# Patient Record
Sex: MALE | Race: WHITE | Employment: OTHER | URBAN - METROPOLITAN AREA
[De-identification: names, ages, dates, MRNs, and addresses within clinical notes are randomized per-mention and may not be internally consistent; named-entity substitution may affect disease eponyms.]

---

## 2017-01-03 ENCOUNTER — NURSE ONLY (OUTPATIENT)
Dept: ENT CLINIC | Age: 62
End: 2017-01-03

## 2017-01-03 DIAGNOSIS — J30.2 SEASONAL ALLERGIC RHINITIS, UNSPECIFIED ALLERGIC RHINITIS TRIGGER: Primary | ICD-10-CM

## 2017-01-03 PROCEDURE — 95117 IMMUNOTHERAPY INJECTIONS: CPT | Performed by: OTOLARYNGOLOGY

## 2017-01-10 ENCOUNTER — NURSE ONLY (OUTPATIENT)
Dept: ENT CLINIC | Age: 62
End: 2017-01-10

## 2017-01-10 DIAGNOSIS — J30.2 SEASONAL ALLERGIC RHINITIS, UNSPECIFIED ALLERGIC RHINITIS TRIGGER: Primary | ICD-10-CM

## 2017-01-10 PROCEDURE — 95117 IMMUNOTHERAPY INJECTIONS: CPT | Performed by: OTOLARYNGOLOGY

## 2017-01-19 ENCOUNTER — NURSE ONLY (OUTPATIENT)
Dept: ENT CLINIC | Age: 62
End: 2017-01-19

## 2017-01-19 DIAGNOSIS — J30.2 SEASONAL ALLERGIC RHINITIS, UNSPECIFIED ALLERGIC RHINITIS TRIGGER: Primary | ICD-10-CM

## 2017-01-19 PROCEDURE — 95117 IMMUNOTHERAPY INJECTIONS: CPT | Performed by: OTOLARYNGOLOGY

## 2017-01-26 ENCOUNTER — NURSE ONLY (OUTPATIENT)
Dept: ENT CLINIC | Age: 62
End: 2017-01-26

## 2017-01-26 DIAGNOSIS — J30.2 SEASONAL ALLERGIC RHINITIS, UNSPECIFIED ALLERGIC RHINITIS TRIGGER: Primary | ICD-10-CM

## 2017-01-26 PROCEDURE — 95117 IMMUNOTHERAPY INJECTIONS: CPT | Performed by: OTOLARYNGOLOGY

## 2017-02-02 ENCOUNTER — TELEPHONE (OUTPATIENT)
Dept: INTERNAL MEDICINE CLINIC | Age: 62
End: 2017-02-02

## 2017-02-02 DIAGNOSIS — R91.8 PULMONARY NODULES: Primary | ICD-10-CM

## 2017-02-03 ENCOUNTER — NURSE ONLY (OUTPATIENT)
Dept: ENT CLINIC | Age: 62
End: 2017-02-03

## 2017-02-03 DIAGNOSIS — J30.2 SEASONAL ALLERGIC RHINITIS, UNSPECIFIED ALLERGIC RHINITIS TRIGGER: Primary | ICD-10-CM

## 2017-02-03 PROCEDURE — 95117 IMMUNOTHERAPY INJECTIONS: CPT | Performed by: OTOLARYNGOLOGY

## 2017-02-07 ENCOUNTER — TELEPHONE (OUTPATIENT)
Dept: ENT CLINIC | Age: 62
End: 2017-02-07

## 2017-02-07 DIAGNOSIS — J20.8 ACUTE BRONCHITIS DUE TO OTHER SPECIFIED ORGANISMS: Primary | ICD-10-CM

## 2017-02-07 RX ORDER — AZITHROMYCIN 250 MG/1
TABLET, FILM COATED ORAL
Qty: 1 PACKET | Refills: 0 | Status: SHIPPED | OUTPATIENT
Start: 2017-02-07 | End: 2017-04-17

## 2017-02-07 RX ORDER — PROMETHAZINE HYDROCHLORIDE AND CODEINE PHOSPHATE 6.25; 1 MG/5ML; MG/5ML
5 SYRUP ORAL EVERY 4 HOURS PRN
Qty: 240 ML | Refills: 0 | OUTPATIENT
Start: 2017-02-07 | End: 2017-02-14

## 2017-02-10 ENCOUNTER — NURSE ONLY (OUTPATIENT)
Dept: ENT CLINIC | Age: 62
End: 2017-02-10

## 2017-02-10 DIAGNOSIS — J30.2 SEASONAL ALLERGIC RHINITIS, UNSPECIFIED ALLERGIC RHINITIS TRIGGER: Primary | ICD-10-CM

## 2017-02-10 PROCEDURE — 95117 IMMUNOTHERAPY INJECTIONS: CPT | Performed by: OTOLARYNGOLOGY

## 2017-02-16 RX ORDER — PANTOPRAZOLE SODIUM 40 MG/1
TABLET, DELAYED RELEASE ORAL
Qty: 30 TABLET | Refills: 1 | Status: SHIPPED | OUTPATIENT
Start: 2017-02-16 | End: 2017-04-17 | Stop reason: SDUPTHER

## 2017-02-17 ENCOUNTER — TELEPHONE (OUTPATIENT)
Dept: INTERNAL MEDICINE CLINIC | Age: 62
End: 2017-02-17

## 2017-02-17 ENCOUNTER — NURSE ONLY (OUTPATIENT)
Dept: ENT CLINIC | Age: 62
End: 2017-02-17

## 2017-02-17 DIAGNOSIS — J30.2 SEASONAL ALLERGIC RHINITIS, UNSPECIFIED ALLERGIC RHINITIS TRIGGER: Primary | ICD-10-CM

## 2017-02-17 PROCEDURE — 95117 IMMUNOTHERAPY INJECTIONS: CPT | Performed by: OTOLARYNGOLOGY

## 2017-02-27 ENCOUNTER — NURSE ONLY (OUTPATIENT)
Dept: ENT CLINIC | Age: 62
End: 2017-02-27

## 2017-02-27 DIAGNOSIS — J30.2 SEASONAL ALLERGIC RHINITIS, UNSPECIFIED ALLERGIC RHINITIS TRIGGER: Primary | ICD-10-CM

## 2017-02-27 PROCEDURE — 95117 IMMUNOTHERAPY INJECTIONS: CPT | Performed by: OTOLARYNGOLOGY

## 2017-03-06 ENCOUNTER — NURSE ONLY (OUTPATIENT)
Dept: ENT CLINIC | Age: 62
End: 2017-03-06

## 2017-03-06 DIAGNOSIS — J30.2 SEASONAL ALLERGIC RHINITIS, UNSPECIFIED ALLERGIC RHINITIS TRIGGER: Primary | ICD-10-CM

## 2017-03-06 PROCEDURE — 95117 IMMUNOTHERAPY INJECTIONS: CPT | Performed by: OTOLARYNGOLOGY

## 2017-03-14 ENCOUNTER — NURSE ONLY (OUTPATIENT)
Dept: ENT CLINIC | Age: 62
End: 2017-03-14

## 2017-03-14 DIAGNOSIS — J30.2 SEASONAL ALLERGIC RHINITIS, UNSPECIFIED ALLERGIC RHINITIS TRIGGER: Primary | ICD-10-CM

## 2017-03-14 PROCEDURE — 95117 IMMUNOTHERAPY INJECTIONS: CPT | Performed by: OTOLARYNGOLOGY

## 2017-03-21 ENCOUNTER — NURSE ONLY (OUTPATIENT)
Dept: ENT CLINIC | Age: 62
End: 2017-03-21

## 2017-03-21 DIAGNOSIS — J30.2 SEASONAL ALLERGIC RHINITIS, UNSPECIFIED ALLERGIC RHINITIS TRIGGER: Primary | ICD-10-CM

## 2017-03-21 PROCEDURE — 95117 IMMUNOTHERAPY INJECTIONS: CPT | Performed by: OTOLARYNGOLOGY

## 2017-03-28 ENCOUNTER — NURSE ONLY (OUTPATIENT)
Dept: ENT CLINIC | Age: 62
End: 2017-03-28

## 2017-03-28 DIAGNOSIS — J30.2 SEASONAL ALLERGIC RHINITIS, UNSPECIFIED ALLERGIC RHINITIS TRIGGER: Primary | ICD-10-CM

## 2017-03-28 PROCEDURE — 95117 IMMUNOTHERAPY INJECTIONS: CPT | Performed by: OTOLARYNGOLOGY

## 2017-04-04 ENCOUNTER — NURSE ONLY (OUTPATIENT)
Dept: ENT CLINIC | Age: 62
End: 2017-04-04

## 2017-04-04 DIAGNOSIS — J30.2 SEASONAL ALLERGIC RHINITIS, UNSPECIFIED ALLERGIC RHINITIS TRIGGER: Primary | ICD-10-CM

## 2017-04-04 PROCEDURE — 95117 IMMUNOTHERAPY INJECTIONS: CPT | Performed by: OTOLARYNGOLOGY

## 2017-04-11 ENCOUNTER — NURSE ONLY (OUTPATIENT)
Dept: ENT CLINIC | Age: 62
End: 2017-04-11

## 2017-04-11 DIAGNOSIS — J30.2 SEASONAL ALLERGIC RHINITIS, UNSPECIFIED ALLERGIC RHINITIS TRIGGER: Primary | ICD-10-CM

## 2017-04-11 PROCEDURE — 95117 IMMUNOTHERAPY INJECTIONS: CPT | Performed by: OTOLARYNGOLOGY

## 2017-04-17 RX ORDER — PANTOPRAZOLE SODIUM 40 MG/1
TABLET, DELAYED RELEASE ORAL
Qty: 30 TABLET | Refills: 0 | Status: SHIPPED | OUTPATIENT
Start: 2017-04-17 | End: 2017-05-15 | Stop reason: SDUPTHER

## 2017-04-18 ENCOUNTER — NURSE ONLY (OUTPATIENT)
Dept: ENT CLINIC | Age: 62
End: 2017-04-18

## 2017-04-18 DIAGNOSIS — J30.2 SEASONAL ALLERGIC RHINITIS, UNSPECIFIED ALLERGIC RHINITIS TRIGGER: Primary | ICD-10-CM

## 2017-04-18 PROCEDURE — 95117 IMMUNOTHERAPY INJECTIONS: CPT | Performed by: OTOLARYNGOLOGY

## 2017-04-25 ENCOUNTER — NURSE ONLY (OUTPATIENT)
Dept: ENT CLINIC | Age: 62
End: 2017-04-25

## 2017-04-25 DIAGNOSIS — J30.2 SEASONAL ALLERGIC RHINITIS, UNSPECIFIED ALLERGIC RHINITIS TRIGGER: Primary | ICD-10-CM

## 2017-04-25 PROCEDURE — 95117 IMMUNOTHERAPY INJECTIONS: CPT | Performed by: OTOLARYNGOLOGY

## 2017-05-02 ENCOUNTER — NURSE ONLY (OUTPATIENT)
Dept: ENT CLINIC | Age: 62
End: 2017-05-02

## 2017-05-02 DIAGNOSIS — J30.2 SEASONAL ALLERGIC RHINITIS, UNSPECIFIED ALLERGIC RHINITIS TRIGGER: Primary | ICD-10-CM

## 2017-05-02 PROCEDURE — 95117 IMMUNOTHERAPY INJECTIONS: CPT | Performed by: OTOLARYNGOLOGY

## 2017-05-09 ENCOUNTER — NURSE ONLY (OUTPATIENT)
Dept: ENT CLINIC | Age: 62
End: 2017-05-09

## 2017-05-09 DIAGNOSIS — J30.2 SEASONAL ALLERGIC RHINITIS, UNSPECIFIED ALLERGIC RHINITIS TRIGGER: Primary | ICD-10-CM

## 2017-05-09 PROCEDURE — 95117 IMMUNOTHERAPY INJECTIONS: CPT | Performed by: OTOLARYNGOLOGY

## 2017-05-15 ENCOUNTER — NURSE ONLY (OUTPATIENT)
Dept: ENT CLINIC | Age: 62
End: 2017-05-15

## 2017-05-15 DIAGNOSIS — J30.2 SEASONAL ALLERGIC RHINITIS, UNSPECIFIED ALLERGIC RHINITIS TRIGGER: Primary | ICD-10-CM

## 2017-05-15 PROCEDURE — 95117 IMMUNOTHERAPY INJECTIONS: CPT | Performed by: OTOLARYNGOLOGY

## 2017-05-16 RX ORDER — PANTOPRAZOLE SODIUM 40 MG/1
TABLET, DELAYED RELEASE ORAL
Qty: 30 TABLET | Refills: 0 | Status: SHIPPED | OUTPATIENT
Start: 2017-05-16 | End: 2017-06-15 | Stop reason: SDUPTHER

## 2017-05-22 ENCOUNTER — NURSE ONLY (OUTPATIENT)
Dept: ENT CLINIC | Age: 62
End: 2017-05-22

## 2017-05-22 DIAGNOSIS — J30.2 SEASONAL ALLERGIC RHINITIS, UNSPECIFIED ALLERGIC RHINITIS TRIGGER: Primary | ICD-10-CM

## 2017-05-22 PROCEDURE — 95117 IMMUNOTHERAPY INJECTIONS: CPT | Performed by: OTOLARYNGOLOGY

## 2017-05-25 ENCOUNTER — TELEPHONE (OUTPATIENT)
Dept: ENT CLINIC | Age: 62
End: 2017-05-25

## 2017-05-25 DIAGNOSIS — J20.9 ACUTE BRONCHITIS, UNSPECIFIED ORGANISM: Primary | ICD-10-CM

## 2017-05-25 RX ORDER — AZITHROMYCIN 250 MG/1
TABLET, FILM COATED ORAL
Qty: 1 PACKET | Refills: 0 | Status: SHIPPED | OUTPATIENT
Start: 2017-05-25 | End: 2017-07-28 | Stop reason: ALTCHOICE

## 2017-05-25 RX ORDER — BENZONATATE 100 MG/1
100 CAPSULE ORAL EVERY 6 HOURS PRN
Qty: 20 CAPSULE | Refills: 0 | Status: SHIPPED | OUTPATIENT
Start: 2017-05-25 | End: 2017-07-28 | Stop reason: ALTCHOICE

## 2017-05-31 ENCOUNTER — NURSE ONLY (OUTPATIENT)
Dept: ENT CLINIC | Age: 62
End: 2017-05-31

## 2017-05-31 DIAGNOSIS — J30.2 SEASONAL ALLERGIC RHINITIS, UNSPECIFIED ALLERGIC RHINITIS TRIGGER: Primary | ICD-10-CM

## 2017-05-31 PROCEDURE — 95117 IMMUNOTHERAPY INJECTIONS: CPT | Performed by: OTOLARYNGOLOGY

## 2017-06-06 RX ORDER — TAMSULOSIN HYDROCHLORIDE 0.4 MG/1
CAPSULE ORAL
Qty: 30 CAPSULE | Refills: 5 | Status: SHIPPED | OUTPATIENT
Start: 2017-06-06 | End: 2017-12-10 | Stop reason: SDUPTHER

## 2017-06-06 RX ORDER — ATORVASTATIN CALCIUM 20 MG/1
TABLET, FILM COATED ORAL
Qty: 30 TABLET | Refills: 5 | Status: SHIPPED | OUTPATIENT
Start: 2017-06-06 | End: 2017-12-10 | Stop reason: SDUPTHER

## 2017-06-07 ENCOUNTER — NURSE ONLY (OUTPATIENT)
Dept: ENT CLINIC | Age: 62
End: 2017-06-07

## 2017-06-07 DIAGNOSIS — J30.2 SEASONAL ALLERGIC RHINITIS, UNSPECIFIED ALLERGIC RHINITIS TRIGGER: Primary | ICD-10-CM

## 2017-06-07 PROCEDURE — 95117 IMMUNOTHERAPY INJECTIONS: CPT | Performed by: OTOLARYNGOLOGY

## 2017-06-14 ENCOUNTER — NURSE ONLY (OUTPATIENT)
Dept: ENT CLINIC | Age: 62
End: 2017-06-14

## 2017-06-14 DIAGNOSIS — J30.2 SEASONAL ALLERGIC RHINITIS, UNSPECIFIED ALLERGIC RHINITIS TRIGGER: Primary | ICD-10-CM

## 2017-06-14 PROCEDURE — 95117 IMMUNOTHERAPY INJECTIONS: CPT | Performed by: OTOLARYNGOLOGY

## 2017-06-15 RX ORDER — PANTOPRAZOLE SODIUM 40 MG/1
TABLET, DELAYED RELEASE ORAL
Qty: 30 TABLET | Refills: 0 | Status: SHIPPED | OUTPATIENT
Start: 2017-06-15 | End: 2017-07-09 | Stop reason: SDUPTHER

## 2017-06-26 ENCOUNTER — NURSE ONLY (OUTPATIENT)
Dept: ENT CLINIC | Age: 62
End: 2017-06-26

## 2017-06-26 DIAGNOSIS — J30.2 SEASONAL ALLERGIC RHINITIS, UNSPECIFIED ALLERGIC RHINITIS TRIGGER: Primary | ICD-10-CM

## 2017-06-26 PROCEDURE — 95117 IMMUNOTHERAPY INJECTIONS: CPT | Performed by: OTOLARYNGOLOGY

## 2017-07-05 ENCOUNTER — NURSE ONLY (OUTPATIENT)
Dept: ENT CLINIC | Age: 62
End: 2017-07-05

## 2017-07-05 DIAGNOSIS — J30.2 SEASONAL ALLERGIC RHINITIS, UNSPECIFIED ALLERGIC RHINITIS TRIGGER: Primary | ICD-10-CM

## 2017-07-05 PROCEDURE — 95117 IMMUNOTHERAPY INJECTIONS: CPT | Performed by: OTOLARYNGOLOGY

## 2017-07-10 ENCOUNTER — TELEPHONE (OUTPATIENT)
Dept: INTERNAL MEDICINE CLINIC | Age: 62
End: 2017-07-10

## 2017-07-10 RX ORDER — PANTOPRAZOLE SODIUM 40 MG/1
TABLET, DELAYED RELEASE ORAL
Qty: 30 TABLET | Refills: 0 | Status: SHIPPED | OUTPATIENT
Start: 2017-07-10 | End: 2017-08-08 | Stop reason: SDUPTHER

## 2017-07-12 ENCOUNTER — NURSE ONLY (OUTPATIENT)
Dept: ENT CLINIC | Age: 62
End: 2017-07-12

## 2017-07-12 DIAGNOSIS — J30.2 SEASONAL ALLERGIC RHINITIS, UNSPECIFIED ALLERGIC RHINITIS TRIGGER: Primary | ICD-10-CM

## 2017-07-12 PROCEDURE — 95117 IMMUNOTHERAPY INJECTIONS: CPT | Performed by: OTOLARYNGOLOGY

## 2017-07-19 ENCOUNTER — NURSE ONLY (OUTPATIENT)
Dept: ENT CLINIC | Age: 62
End: 2017-07-19

## 2017-07-19 DIAGNOSIS — J30.2 SEASONAL ALLERGIC RHINITIS, UNSPECIFIED ALLERGIC RHINITIS TRIGGER: Primary | ICD-10-CM

## 2017-07-19 PROCEDURE — 95117 IMMUNOTHERAPY INJECTIONS: CPT | Performed by: OTOLARYNGOLOGY

## 2017-07-26 ENCOUNTER — NURSE ONLY (OUTPATIENT)
Dept: ENT CLINIC | Age: 62
End: 2017-07-26

## 2017-07-26 DIAGNOSIS — J30.2 SEASONAL ALLERGIC RHINITIS, UNSPECIFIED ALLERGIC RHINITIS TRIGGER: Primary | ICD-10-CM

## 2017-07-26 PROCEDURE — 95117 IMMUNOTHERAPY INJECTIONS: CPT | Performed by: OTOLARYNGOLOGY

## 2017-07-28 ENCOUNTER — OFFICE VISIT (OUTPATIENT)
Dept: INTERNAL MEDICINE CLINIC | Age: 62
End: 2017-07-28

## 2017-07-28 VITALS
DIASTOLIC BLOOD PRESSURE: 74 MMHG | BODY MASS INDEX: 32.95 KG/M2 | SYSTOLIC BLOOD PRESSURE: 128 MMHG | HEIGHT: 66 IN | HEART RATE: 80 BPM | WEIGHT: 205 LBS

## 2017-07-28 DIAGNOSIS — B07.0 PLANTAR WART: ICD-10-CM

## 2017-07-28 DIAGNOSIS — E78.00 HYPERCHOLESTEREMIA: ICD-10-CM

## 2017-07-28 DIAGNOSIS — N40.1 BENIGN PROSTATIC HYPERPLASIA WITH LOWER URINARY TRACT SYMPTOMS, UNSPECIFIED MORPHOLOGY: ICD-10-CM

## 2017-07-28 DIAGNOSIS — E11.9 TYPE 2 DIABETES MELLITUS WITHOUT COMPLICATION, WITHOUT LONG-TERM CURRENT USE OF INSULIN (HCC): Primary | ICD-10-CM

## 2017-07-28 LAB
ALBUMIN SERPL-MCNC: 4.6 G/DL (ref 3.4–5)
ALP BLD-CCNC: 73 U/L (ref 40–129)
ALT SERPL-CCNC: 44 U/L (ref 10–40)
ANION GAP SERPL CALCULATED.3IONS-SCNC: 16 MMOL/L (ref 3–16)
AST SERPL-CCNC: 22 U/L (ref 15–37)
BILIRUB SERPL-MCNC: 0.3 MG/DL (ref 0–1)
BILIRUBIN DIRECT: <0.2 MG/DL (ref 0–0.3)
BILIRUBIN, INDIRECT: ABNORMAL MG/DL (ref 0–1)
BUN BLDV-MCNC: 21 MG/DL (ref 7–20)
CALCIUM SERPL-MCNC: 9.9 MG/DL (ref 8.3–10.6)
CHLORIDE BLD-SCNC: 103 MMOL/L (ref 99–110)
CHOLESTEROL, TOTAL: 154 MG/DL (ref 0–199)
CO2: 24 MMOL/L (ref 21–32)
CREAT SERPL-MCNC: 0.7 MG/DL (ref 0.8–1.3)
CREATININE URINE: 75.2 MG/DL (ref 39–259)
GFR AFRICAN AMERICAN: >60
GFR NON-AFRICAN AMERICAN: >60
GLUCOSE BLD-MCNC: 94 MG/DL (ref 70–99)
HDLC SERPL-MCNC: 59 MG/DL (ref 40–60)
LDL CHOLESTEROL CALCULATED: 83 MG/DL
MICROALBUMIN UR-MCNC: <1.2 MG/DL
MICROALBUMIN/CREAT UR-RTO: NORMAL MG/G (ref 0–30)
PHOSPHORUS: 3.2 MG/DL (ref 2.5–4.9)
POTASSIUM SERPL-SCNC: 3.8 MMOL/L (ref 3.5–5.1)
SODIUM BLD-SCNC: 143 MMOL/L (ref 136–145)
TOTAL PROTEIN: 7 G/DL (ref 6.4–8.2)
TRIGL SERPL-MCNC: 58 MG/DL (ref 0–150)
VLDLC SERPL CALC-MCNC: 12 MG/DL

## 2017-07-28 PROCEDURE — 99214 OFFICE O/P EST MOD 30 MIN: CPT | Performed by: INTERNAL MEDICINE

## 2017-07-28 ASSESSMENT — PATIENT HEALTH QUESTIONNAIRE - PHQ9
SUM OF ALL RESPONSES TO PHQ QUESTIONS 1-9: 0
SUM OF ALL RESPONSES TO PHQ9 QUESTIONS 1 & 2: 0
2. FEELING DOWN, DEPRESSED OR HOPELESS: 0
1. LITTLE INTEREST OR PLEASURE IN DOING THINGS: 0

## 2017-07-29 LAB
ESTIMATED AVERAGE GLUCOSE: 131.2 MG/DL
HBA1C MFR BLD: 6.2 %

## 2017-08-02 ENCOUNTER — NURSE ONLY (OUTPATIENT)
Dept: ENT CLINIC | Age: 62
End: 2017-08-02

## 2017-08-02 DIAGNOSIS — J30.2 SEASONAL ALLERGIC RHINITIS, UNSPECIFIED ALLERGIC RHINITIS TRIGGER: Primary | ICD-10-CM

## 2017-08-02 PROCEDURE — 95117 IMMUNOTHERAPY INJECTIONS: CPT | Performed by: OTOLARYNGOLOGY

## 2017-08-09 ENCOUNTER — NURSE ONLY (OUTPATIENT)
Dept: ENT CLINIC | Age: 62
End: 2017-08-09

## 2017-08-09 DIAGNOSIS — J30.2 SEASONAL ALLERGIC RHINITIS, UNSPECIFIED ALLERGIC RHINITIS TRIGGER: Primary | ICD-10-CM

## 2017-08-09 PROCEDURE — 95117 IMMUNOTHERAPY INJECTIONS: CPT | Performed by: OTOLARYNGOLOGY

## 2017-08-09 RX ORDER — PANTOPRAZOLE SODIUM 40 MG/1
TABLET, DELAYED RELEASE ORAL
Qty: 30 TABLET | Refills: 0 | Status: SHIPPED | OUTPATIENT
Start: 2017-08-09 | End: 2017-09-06 | Stop reason: SDUPTHER

## 2017-08-16 ENCOUNTER — NURSE ONLY (OUTPATIENT)
Dept: ENT CLINIC | Age: 62
End: 2017-08-16

## 2017-08-16 DIAGNOSIS — J30.2 SEASONAL ALLERGIC RHINITIS, UNSPECIFIED ALLERGIC RHINITIS TRIGGER: Primary | ICD-10-CM

## 2017-08-16 PROCEDURE — 95117 IMMUNOTHERAPY INJECTIONS: CPT | Performed by: OTOLARYNGOLOGY

## 2017-08-23 ENCOUNTER — NURSE ONLY (OUTPATIENT)
Dept: ENT CLINIC | Age: 62
End: 2017-08-23

## 2017-08-23 DIAGNOSIS — J30.2 SEASONAL ALLERGIC RHINITIS, UNSPECIFIED ALLERGIC RHINITIS TRIGGER: Primary | ICD-10-CM

## 2017-08-23 PROCEDURE — 95117 IMMUNOTHERAPY INJECTIONS: CPT | Performed by: OTOLARYNGOLOGY

## 2017-08-30 ENCOUNTER — NURSE ONLY (OUTPATIENT)
Dept: ENT CLINIC | Age: 62
End: 2017-08-30

## 2017-08-30 DIAGNOSIS — J30.2 SEASONAL ALLERGIC RHINITIS, UNSPECIFIED ALLERGIC RHINITIS TRIGGER: Primary | ICD-10-CM

## 2017-08-30 PROCEDURE — 95117 IMMUNOTHERAPY INJECTIONS: CPT | Performed by: OTOLARYNGOLOGY

## 2017-09-06 ENCOUNTER — NURSE ONLY (OUTPATIENT)
Dept: ENT CLINIC | Age: 62
End: 2017-09-06

## 2017-09-06 DIAGNOSIS — J30.2 SEASONAL ALLERGIC RHINITIS, UNSPECIFIED ALLERGIC RHINITIS TRIGGER: Primary | ICD-10-CM

## 2017-09-06 PROCEDURE — 95117 IMMUNOTHERAPY INJECTIONS: CPT | Performed by: OTOLARYNGOLOGY

## 2017-09-07 RX ORDER — PANTOPRAZOLE SODIUM 40 MG/1
TABLET, DELAYED RELEASE ORAL
Qty: 30 TABLET | Refills: 0 | Status: SHIPPED | OUTPATIENT
Start: 2017-09-07 | End: 2017-10-15 | Stop reason: SDUPTHER

## 2017-09-13 ENCOUNTER — NURSE ONLY (OUTPATIENT)
Dept: ENT CLINIC | Age: 62
End: 2017-09-13

## 2017-09-13 DIAGNOSIS — J30.2 SEASONAL ALLERGIC RHINITIS, UNSPECIFIED ALLERGIC RHINITIS TRIGGER: ICD-10-CM

## 2017-09-13 PROCEDURE — 95117 IMMUNOTHERAPY INJECTIONS: CPT | Performed by: OTOLARYNGOLOGY

## 2017-09-20 ENCOUNTER — NURSE ONLY (OUTPATIENT)
Dept: ENT CLINIC | Age: 62
End: 2017-09-20

## 2017-09-20 DIAGNOSIS — J30.2 SEASONAL ALLERGIC RHINITIS, UNSPECIFIED ALLERGIC RHINITIS TRIGGER: ICD-10-CM

## 2017-09-20 PROCEDURE — 95117 IMMUNOTHERAPY INJECTIONS: CPT | Performed by: OTOLARYNGOLOGY

## 2017-09-26 ENCOUNTER — NURSE ONLY (OUTPATIENT)
Dept: ENT CLINIC | Age: 62
End: 2017-09-26

## 2017-09-26 DIAGNOSIS — J30.2 SEASONAL ALLERGIC RHINITIS, UNSPECIFIED ALLERGIC RHINITIS TRIGGER: ICD-10-CM

## 2017-09-26 PROCEDURE — 95117 IMMUNOTHERAPY INJECTIONS: CPT | Performed by: OTOLARYNGOLOGY

## 2017-10-02 ENCOUNTER — NURSE ONLY (OUTPATIENT)
Dept: ENT CLINIC | Age: 62
End: 2017-10-02

## 2017-10-02 DIAGNOSIS — J30.2 SEASONAL ALLERGIC RHINITIS, UNSPECIFIED ALLERGIC RHINITIS TRIGGER: ICD-10-CM

## 2017-10-02 PROCEDURE — 95117 IMMUNOTHERAPY INJECTIONS: CPT | Performed by: OTOLARYNGOLOGY

## 2017-10-02 NOTE — PROGRESS NOTES
After obtaining consent, and per orders of Dr García Delgado, injections of allergy serum given in left  arm by Lily Siddiqi. Patient instructed to remain in clinic for 20 minutes afterwards, and to report any adverse reaction to me immediately.

## 2017-10-09 ENCOUNTER — NURSE ONLY (OUTPATIENT)
Dept: ENT CLINIC | Age: 62
End: 2017-10-09

## 2017-10-09 DIAGNOSIS — J30.2 SEASONAL ALLERGIC RHINITIS, UNSPECIFIED ALLERGIC RHINITIS TRIGGER: ICD-10-CM

## 2017-10-09 PROCEDURE — 95117 IMMUNOTHERAPY INJECTIONS: CPT | Performed by: OTOLARYNGOLOGY

## 2017-10-09 NOTE — PROGRESS NOTES
After obtaining consent, and per orders of Dr Nishant Velázquez, injections of allergy serum given in  right arm by Iglesia Rogel. Patient instructed to remain in clinic for 20 minutes afterwards, and to report any adverse reaction to me immediately.

## 2017-10-16 ENCOUNTER — NURSE ONLY (OUTPATIENT)
Dept: ENT CLINIC | Age: 62
End: 2017-10-16

## 2017-10-16 DIAGNOSIS — J30.2 CHRONIC SEASONAL ALLERGIC RHINITIS DUE TO OTHER ALLERGEN: ICD-10-CM

## 2017-10-16 PROCEDURE — 95117 IMMUNOTHERAPY INJECTIONS: CPT | Performed by: OTOLARYNGOLOGY

## 2017-10-16 RX ORDER — FINASTERIDE 5 MG/1
TABLET, FILM COATED ORAL
Qty: 30 TABLET | Refills: 5 | Status: SHIPPED | OUTPATIENT
Start: 2017-10-16 | End: 2018-04-13 | Stop reason: SDUPTHER

## 2017-10-16 RX ORDER — PANTOPRAZOLE SODIUM 40 MG/1
TABLET, DELAYED RELEASE ORAL
Qty: 30 TABLET | Refills: 0 | Status: SHIPPED | OUTPATIENT
Start: 2017-10-16 | End: 2017-11-14 | Stop reason: SDUPTHER

## 2017-10-30 ENCOUNTER — NURSE ONLY (OUTPATIENT)
Dept: ENT CLINIC | Age: 62
End: 2017-10-30

## 2017-10-30 DIAGNOSIS — J30.2 CHRONIC SEASONAL ALLERGIC RHINITIS DUE TO OTHER ALLERGEN: ICD-10-CM

## 2017-10-30 PROCEDURE — 95117 IMMUNOTHERAPY INJECTIONS: CPT | Performed by: OTOLARYNGOLOGY

## 2017-11-06 ENCOUNTER — NURSE ONLY (OUTPATIENT)
Dept: ENT CLINIC | Age: 62
End: 2017-11-06

## 2017-11-06 DIAGNOSIS — J30.2 CHRONIC SEASONAL ALLERGIC RHINITIS DUE TO OTHER ALLERGEN: ICD-10-CM

## 2017-11-06 PROCEDURE — 95117 IMMUNOTHERAPY INJECTIONS: CPT | Performed by: OTOLARYNGOLOGY

## 2017-11-13 ENCOUNTER — NURSE ONLY (OUTPATIENT)
Dept: ENT CLINIC | Age: 62
End: 2017-11-13

## 2017-11-13 DIAGNOSIS — J30.2 CHRONIC SEASONAL ALLERGIC RHINITIS DUE TO OTHER ALLERGEN: ICD-10-CM

## 2017-11-13 PROCEDURE — 95117 IMMUNOTHERAPY INJECTIONS: CPT | Performed by: OTOLARYNGOLOGY

## 2017-11-13 NOTE — PROGRESS NOTES
After obtaining consent, and per orders of Dr Haley Rolon, injections of allergy serum given in  right arm by Mancil Copping. Patient instructed to remain in clinic for 20 minutes afterwards, and to report any adverse reaction to me immediately.

## 2017-11-14 RX ORDER — PANTOPRAZOLE SODIUM 40 MG/1
TABLET, DELAYED RELEASE ORAL
Qty: 30 TABLET | Refills: 0 | Status: SHIPPED | OUTPATIENT
Start: 2017-11-14 | End: 2017-12-16 | Stop reason: SDUPTHER

## 2017-11-20 ENCOUNTER — NURSE ONLY (OUTPATIENT)
Dept: ENT CLINIC | Age: 62
End: 2017-11-20

## 2017-11-20 DIAGNOSIS — J30.2 CHRONIC SEASONAL ALLERGIC RHINITIS DUE TO OTHER ALLERGEN: ICD-10-CM

## 2017-11-20 PROCEDURE — 95117 IMMUNOTHERAPY INJECTIONS: CPT | Performed by: OTOLARYNGOLOGY

## 2017-11-27 ENCOUNTER — NURSE ONLY (OUTPATIENT)
Dept: ENT CLINIC | Age: 62
End: 2017-11-27

## 2017-11-27 DIAGNOSIS — J30.2 CHRONIC SEASONAL ALLERGIC RHINITIS DUE TO OTHER ALLERGEN: ICD-10-CM

## 2017-11-27 PROCEDURE — 95117 IMMUNOTHERAPY INJECTIONS: CPT | Performed by: OTOLARYNGOLOGY

## 2017-11-27 NOTE — PROGRESS NOTES
After obtaining consent, and per orders of Dr Efren Escudero, injections of allergy serum given in right arm by Alanna Ford. Patient instructed to remain in clinic for 20 minutes afterwards, and to report any adverse reaction to me immediately.

## 2017-12-04 ENCOUNTER — NURSE ONLY (OUTPATIENT)
Dept: ENT CLINIC | Age: 62
End: 2017-12-04

## 2017-12-04 DIAGNOSIS — J30.2 CHRONIC SEASONAL ALLERGIC RHINITIS DUE TO OTHER ALLERGEN: ICD-10-CM

## 2017-12-04 PROCEDURE — 95117 IMMUNOTHERAPY INJECTIONS: CPT | Performed by: OTOLARYNGOLOGY

## 2017-12-11 ENCOUNTER — NURSE ONLY (OUTPATIENT)
Dept: ENT CLINIC | Age: 62
End: 2017-12-11

## 2017-12-11 DIAGNOSIS — J30.2 CHRONIC SEASONAL ALLERGIC RHINITIS DUE TO OTHER ALLERGEN: ICD-10-CM

## 2017-12-11 PROCEDURE — 95117 IMMUNOTHERAPY INJECTIONS: CPT | Performed by: OTOLARYNGOLOGY

## 2017-12-11 RX ORDER — ATORVASTATIN CALCIUM 20 MG/1
TABLET, FILM COATED ORAL
Qty: 30 TABLET | Refills: 5 | Status: SHIPPED | OUTPATIENT
Start: 2017-12-11 | End: 2018-06-14 | Stop reason: SDUPTHER

## 2017-12-11 RX ORDER — TAMSULOSIN HYDROCHLORIDE 0.4 MG/1
CAPSULE ORAL
Qty: 30 CAPSULE | Refills: 5 | Status: SHIPPED | OUTPATIENT
Start: 2017-12-11 | End: 2018-06-14 | Stop reason: SDUPTHER

## 2017-12-11 NOTE — PROGRESS NOTES
After obtaining consent, and per orders of Dr Jurgen Couch, injections of allergy serum given in left  arm by Mickael Ganser. Patient instructed to remain in clinic for 20 minutes afterwards, and to report any adverse reaction to me immediately.

## 2017-12-18 ENCOUNTER — NURSE ONLY (OUTPATIENT)
Dept: ENT CLINIC | Age: 62
End: 2017-12-18

## 2017-12-18 DIAGNOSIS — J30.2 CHRONIC SEASONAL ALLERGIC RHINITIS DUE TO OTHER ALLERGEN: ICD-10-CM

## 2017-12-18 PROCEDURE — 95117 IMMUNOTHERAPY INJECTIONS: CPT | Performed by: OTOLARYNGOLOGY

## 2017-12-18 RX ORDER — PANTOPRAZOLE SODIUM 40 MG/1
TABLET, DELAYED RELEASE ORAL
Qty: 30 TABLET | Refills: 0 | Status: SHIPPED | OUTPATIENT
Start: 2017-12-18 | End: 2018-01-18 | Stop reason: SDUPTHER

## 2017-12-18 NOTE — PROGRESS NOTES
After obtaining consent, and per orders of Dr Javon Her, injections of allergy serum given in  right arm by Kenney Patel. Patient instructed to remain in clinic for 20 minutes afterwards, and to report any adverse reaction to me immediately.

## 2017-12-27 ENCOUNTER — NURSE ONLY (OUTPATIENT)
Dept: ENT CLINIC | Age: 62
End: 2017-12-27

## 2017-12-27 DIAGNOSIS — J30.2 CHRONIC SEASONAL ALLERGIC RHINITIS DUE TO OTHER ALLERGEN: ICD-10-CM

## 2017-12-27 PROCEDURE — 95117 IMMUNOTHERAPY INJECTIONS: CPT | Performed by: OTOLARYNGOLOGY

## 2018-01-02 ENCOUNTER — NURSE ONLY (OUTPATIENT)
Dept: ENT CLINIC | Age: 63
End: 2018-01-02

## 2018-01-02 DIAGNOSIS — J30.2 CHRONIC SEASONAL ALLERGIC RHINITIS DUE TO OTHER ALLERGEN: ICD-10-CM

## 2018-01-02 PROCEDURE — 95117 IMMUNOTHERAPY INJECTIONS: CPT | Performed by: OTOLARYNGOLOGY

## 2018-01-08 ENCOUNTER — NURSE ONLY (OUTPATIENT)
Dept: ENT CLINIC | Age: 63
End: 2018-01-08

## 2018-01-08 DIAGNOSIS — J30.2 CHRONIC SEASONAL ALLERGIC RHINITIS DUE TO OTHER ALLERGEN: ICD-10-CM

## 2018-01-08 PROCEDURE — 95117 IMMUNOTHERAPY INJECTIONS: CPT | Performed by: OTOLARYNGOLOGY

## 2018-01-15 ENCOUNTER — NURSE ONLY (OUTPATIENT)
Dept: ENT CLINIC | Age: 63
End: 2018-01-15

## 2018-01-15 DIAGNOSIS — J30.2 CHRONIC SEASONAL ALLERGIC RHINITIS DUE TO OTHER ALLERGEN: ICD-10-CM

## 2018-01-15 PROCEDURE — 95117 IMMUNOTHERAPY INJECTIONS: CPT | Performed by: OTOLARYNGOLOGY

## 2018-01-15 NOTE — PROGRESS NOTES
After obtaining consent, and per orders of Dr Aida Gaucher, injections of allergy serum given in left  arm by Allison Patterson. Patient instructed to remain in clinic for 20 minutes afterwards, and to report any adverse reaction to me immediately.

## 2018-01-18 RX ORDER — PANTOPRAZOLE SODIUM 40 MG/1
TABLET, DELAYED RELEASE ORAL
Qty: 30 TABLET | Refills: 2 | Status: SHIPPED | OUTPATIENT
Start: 2018-01-18 | End: 2018-04-20 | Stop reason: SDUPTHER

## 2018-01-22 ENCOUNTER — TELEPHONE (OUTPATIENT)
Dept: FAMILY MEDICINE CLINIC | Age: 63
End: 2018-01-22

## 2018-01-23 ENCOUNTER — NURSE ONLY (OUTPATIENT)
Dept: ENT CLINIC | Age: 63
End: 2018-01-23

## 2018-01-23 DIAGNOSIS — J30.2 CHRONIC SEASONAL ALLERGIC RHINITIS DUE TO OTHER ALLERGEN: ICD-10-CM

## 2018-01-23 PROCEDURE — 95117 IMMUNOTHERAPY INJECTIONS: CPT | Performed by: OTOLARYNGOLOGY

## 2018-01-23 NOTE — TELEPHONE ENCOUNTER
Attempted to contact patient on 1/23/2018. Result: left message on the patient's voicemail asking patient to return my call. Final attempt to reach patient. Pre-Visit planning not completed.        Rebecca Gonzalez  Patient Services Specialist  [phone]

## 2018-01-25 RX ORDER — CLOBETASOL PROPIONATE 0.5 MG/G
CREAM TOPICAL DAILY
COMMUNITY

## 2018-01-26 ENCOUNTER — OFFICE VISIT (OUTPATIENT)
Dept: INTERNAL MEDICINE CLINIC | Age: 63
End: 2018-01-26

## 2018-01-26 VITALS
SYSTOLIC BLOOD PRESSURE: 136 MMHG | HEIGHT: 66 IN | WEIGHT: 223.6 LBS | BODY MASS INDEX: 35.93 KG/M2 | DIASTOLIC BLOOD PRESSURE: 84 MMHG | HEART RATE: 72 BPM

## 2018-01-26 DIAGNOSIS — Z23 NEED FOR PNEUMOCOCCAL VACCINATION: ICD-10-CM

## 2018-01-26 DIAGNOSIS — E78.00 PURE HYPERCHOLESTEROLEMIA: ICD-10-CM

## 2018-01-26 DIAGNOSIS — E11.9 TYPE 2 DIABETES MELLITUS WITHOUT COMPLICATION, WITHOUT LONG-TERM CURRENT USE OF INSULIN (HCC): Primary | ICD-10-CM

## 2018-01-26 LAB — HBA1C MFR BLD: 6.2 %

## 2018-01-26 PROCEDURE — 90471 IMMUNIZATION ADMIN: CPT | Performed by: INTERNAL MEDICINE

## 2018-01-26 PROCEDURE — 83036 HEMOGLOBIN GLYCOSYLATED A1C: CPT | Performed by: INTERNAL MEDICINE

## 2018-01-26 PROCEDURE — 99213 OFFICE O/P EST LOW 20 MIN: CPT | Performed by: INTERNAL MEDICINE

## 2018-01-26 PROCEDURE — 90732 PPSV23 VACC 2 YRS+ SUBQ/IM: CPT | Performed by: INTERNAL MEDICINE

## 2018-01-26 RX ORDER — TRIAMCINOLONE ACETONIDE 0.25 MG/G
CREAM TOPICAL EVERY 4 HOURS PRN
COMMUNITY

## 2018-01-26 NOTE — PROGRESS NOTES
Component Value Date    LABVLDL 12 07/28/2017    LABVLDL 9 04/30/2013     No results found for: CHOLHDLRATIO       ASSESSMENT/PLAN:    1. Type 2 diabetes mellitus without complication, without long-term current use of insulin (HCC)  Well controlled. He was encouraged to continue focusing on healthy diet and regular exercise as well as weight management.  - POCT glycosylated hemoglobin (Hb A1C)    2. Pure hypercholesterolemia  Well controlled. The current medical regimen is effective;  continue present plan and medications. 3. Need for pneumococcal vaccination    - Pneumococcal polysaccharide vaccine 23-valent >= 3yo subcutaneous/IM (PNEUMOVAX 23)           Scribe attestation: Mikki Johnson MA, am scribing for and in the presence of Jose Armando Crews MD. Electronically signed by Bernabe Sandoval MA on 1/26/2018 at 3:39 PM      Provider attestation: Kathleen Klein MD  personally performed the services described in this documentation, as scribed by the user listed above in my presence, and it is both accurate and complete. I agree with the Chief Complaint, ROS, and Past Histories independently gathered by the clinical support staff and the remaining scribed note accurately describes my personal service to the patient.     1/26/2018    3:44 PM

## 2018-01-29 ENCOUNTER — NURSE ONLY (OUTPATIENT)
Dept: ENT CLINIC | Age: 63
End: 2018-01-29

## 2018-01-29 DIAGNOSIS — J30.2 CHRONIC SEASONAL ALLERGIC RHINITIS DUE TO OTHER ALLERGEN: ICD-10-CM

## 2018-01-29 PROCEDURE — 95117 IMMUNOTHERAPY INJECTIONS: CPT | Performed by: OTOLARYNGOLOGY

## 2018-02-05 ENCOUNTER — NURSE ONLY (OUTPATIENT)
Dept: ENT CLINIC | Age: 63
End: 2018-02-05

## 2018-02-05 DIAGNOSIS — J30.2 CHRONIC SEASONAL ALLERGIC RHINITIS DUE TO OTHER ALLERGEN: ICD-10-CM

## 2018-02-05 PROCEDURE — 95117 IMMUNOTHERAPY INJECTIONS: CPT | Performed by: OTOLARYNGOLOGY

## 2018-02-12 ENCOUNTER — NURSE ONLY (OUTPATIENT)
Dept: ENT CLINIC | Age: 63
End: 2018-02-12

## 2018-02-12 DIAGNOSIS — J30.2 CHRONIC SEASONAL ALLERGIC RHINITIS DUE TO OTHER ALLERGEN: ICD-10-CM

## 2018-02-12 PROCEDURE — 95117 IMMUNOTHERAPY INJECTIONS: CPT | Performed by: OTOLARYNGOLOGY

## 2018-02-19 ENCOUNTER — NURSE ONLY (OUTPATIENT)
Dept: ENT CLINIC | Age: 63
End: 2018-02-19

## 2018-02-19 DIAGNOSIS — J30.2 CHRONIC SEASONAL ALLERGIC RHINITIS DUE TO OTHER ALLERGEN: ICD-10-CM

## 2018-02-19 PROCEDURE — 95117 IMMUNOTHERAPY INJECTIONS: CPT | Performed by: OTOLARYNGOLOGY

## 2018-02-26 ENCOUNTER — NURSE ONLY (OUTPATIENT)
Dept: ENT CLINIC | Age: 63
End: 2018-02-26

## 2018-02-26 DIAGNOSIS — J30.2 CHRONIC SEASONAL ALLERGIC RHINITIS DUE TO OTHER ALLERGEN: ICD-10-CM

## 2018-02-26 PROCEDURE — 95117 IMMUNOTHERAPY INJECTIONS: CPT | Performed by: OTOLARYNGOLOGY

## 2018-03-05 ENCOUNTER — NURSE ONLY (OUTPATIENT)
Dept: ENT CLINIC | Age: 63
End: 2018-03-05

## 2018-03-05 DIAGNOSIS — J30.2 CHRONIC SEASONAL ALLERGIC RHINITIS DUE TO OTHER ALLERGEN: ICD-10-CM

## 2018-03-05 PROCEDURE — 95117 IMMUNOTHERAPY INJECTIONS: CPT | Performed by: OTOLARYNGOLOGY

## 2018-03-12 ENCOUNTER — NURSE ONLY (OUTPATIENT)
Dept: ENT CLINIC | Age: 63
End: 2018-03-12

## 2018-03-12 DIAGNOSIS — J30.2 CHRONIC SEASONAL ALLERGIC RHINITIS DUE TO OTHER ALLERGEN: ICD-10-CM

## 2018-03-12 PROCEDURE — 95117 IMMUNOTHERAPY INJECTIONS: CPT | Performed by: OTOLARYNGOLOGY

## 2018-03-13 ENCOUNTER — OFFICE VISIT (OUTPATIENT)
Dept: ENT CLINIC | Age: 63
End: 2018-03-13

## 2018-03-13 DIAGNOSIS — H90.3 SENSORINEURAL HEARING LOSS OF BOTH EARS: Primary | ICD-10-CM

## 2018-03-13 PROCEDURE — 92553 AUDIOMETRY AIR & BONE: CPT | Performed by: AUDIOLOGIST

## 2018-03-19 ENCOUNTER — NURSE ONLY (OUTPATIENT)
Dept: ENT CLINIC | Age: 63
End: 2018-03-19

## 2018-03-19 DIAGNOSIS — J30.2 CHRONIC SEASONAL ALLERGIC RHINITIS DUE TO OTHER ALLERGEN: ICD-10-CM

## 2018-03-19 PROCEDURE — 95117 IMMUNOTHERAPY INJECTIONS: CPT | Performed by: OTOLARYNGOLOGY

## 2018-03-26 ENCOUNTER — NURSE ONLY (OUTPATIENT)
Dept: ENT CLINIC | Age: 63
End: 2018-03-26

## 2018-03-26 DIAGNOSIS — J30.2 CHRONIC SEASONAL ALLERGIC RHINITIS DUE TO OTHER ALLERGEN: ICD-10-CM

## 2018-03-26 PROCEDURE — 95117 IMMUNOTHERAPY INJECTIONS: CPT | Performed by: OTOLARYNGOLOGY

## 2018-04-04 ENCOUNTER — NURSE ONLY (OUTPATIENT)
Dept: ENT CLINIC | Age: 63
End: 2018-04-04

## 2018-04-04 DIAGNOSIS — J30.2 CHRONIC SEASONAL ALLERGIC RHINITIS DUE TO OTHER ALLERGEN: ICD-10-CM

## 2018-04-04 PROCEDURE — 95117 IMMUNOTHERAPY INJECTIONS: CPT | Performed by: OTOLARYNGOLOGY

## 2018-04-10 ENCOUNTER — NURSE ONLY (OUTPATIENT)
Dept: ENT CLINIC | Age: 63
End: 2018-04-10

## 2018-04-10 DIAGNOSIS — J30.2 CHRONIC SEASONAL ALLERGIC RHINITIS DUE TO OTHER ALLERGEN: ICD-10-CM

## 2018-04-10 PROCEDURE — 95117 IMMUNOTHERAPY INJECTIONS: CPT | Performed by: OTOLARYNGOLOGY

## 2018-04-13 RX ORDER — FINASTERIDE 5 MG/1
TABLET, FILM COATED ORAL
Qty: 30 TABLET | Refills: 5 | Status: SHIPPED | OUTPATIENT
Start: 2018-04-13 | End: 2018-10-21 | Stop reason: SDUPTHER

## 2018-04-16 ENCOUNTER — NURSE ONLY (OUTPATIENT)
Dept: ENT CLINIC | Age: 63
End: 2018-04-16

## 2018-04-16 DIAGNOSIS — J30.2 CHRONIC SEASONAL ALLERGIC RHINITIS DUE TO OTHER ALLERGEN: ICD-10-CM

## 2018-04-16 PROCEDURE — 95117 IMMUNOTHERAPY INJECTIONS: CPT | Performed by: OTOLARYNGOLOGY

## 2018-04-20 ENCOUNTER — TELEPHONE (OUTPATIENT)
Dept: ENT CLINIC | Age: 63
End: 2018-04-20

## 2018-04-20 RX ORDER — PANTOPRAZOLE SODIUM 40 MG/1
TABLET, DELAYED RELEASE ORAL
Qty: 30 TABLET | Refills: 3 | Status: SHIPPED | OUTPATIENT
Start: 2018-04-20 | End: 2018-07-27 | Stop reason: SDUPTHER

## 2018-04-23 ENCOUNTER — NURSE ONLY (OUTPATIENT)
Dept: ENT CLINIC | Age: 63
End: 2018-04-23

## 2018-04-23 DIAGNOSIS — J30.2 CHRONIC SEASONAL ALLERGIC RHINITIS DUE TO OTHER ALLERGEN: ICD-10-CM

## 2018-04-23 PROCEDURE — 95117 IMMUNOTHERAPY INJECTIONS: CPT | Performed by: OTOLARYNGOLOGY

## 2018-04-30 ENCOUNTER — NURSE ONLY (OUTPATIENT)
Dept: ENT CLINIC | Age: 63
End: 2018-04-30

## 2018-04-30 DIAGNOSIS — J30.2 CHRONIC SEASONAL ALLERGIC RHINITIS DUE TO OTHER ALLERGEN: ICD-10-CM

## 2018-04-30 PROCEDURE — 95117 IMMUNOTHERAPY INJECTIONS: CPT | Performed by: OTOLARYNGOLOGY

## 2018-05-04 RX ORDER — LANCETS 30 GAUGE
EACH MISCELLANEOUS
Qty: 100 EACH | Refills: 3 | Status: SHIPPED | OUTPATIENT
Start: 2018-05-04 | End: 2019-05-25 | Stop reason: SDUPTHER

## 2018-05-07 ENCOUNTER — NURSE ONLY (OUTPATIENT)
Dept: ENT CLINIC | Age: 63
End: 2018-05-07

## 2018-05-07 DIAGNOSIS — J30.2 CHRONIC SEASONAL ALLERGIC RHINITIS DUE TO OTHER ALLERGEN: ICD-10-CM

## 2018-05-07 PROCEDURE — 95117 IMMUNOTHERAPY INJECTIONS: CPT | Performed by: OTOLARYNGOLOGY

## 2018-05-14 ENCOUNTER — NURSE ONLY (OUTPATIENT)
Dept: ENT CLINIC | Age: 63
End: 2018-05-14

## 2018-05-14 ENCOUNTER — TELEPHONE (OUTPATIENT)
Dept: ENT CLINIC | Age: 63
End: 2018-05-14

## 2018-05-14 DIAGNOSIS — J30.9 ALLERGIC SINUSITIS: Primary | ICD-10-CM

## 2018-05-14 DIAGNOSIS — J30.9 ALLERGIC SINUSITIS: ICD-10-CM

## 2018-05-14 DIAGNOSIS — J30.2 CHRONIC SEASONAL ALLERGIC RHINITIS DUE TO OTHER ALLERGEN: Primary | ICD-10-CM

## 2018-05-14 PROCEDURE — 96372 THER/PROPH/DIAG INJ SC/IM: CPT | Performed by: OTOLARYNGOLOGY

## 2018-05-14 RX ORDER — METHYLPREDNISOLONE ACETATE 40 MG/ML
40 INJECTION, SUSPENSION INTRA-ARTICULAR; INTRALESIONAL; INTRAMUSCULAR; SOFT TISSUE ONCE
Qty: 1 ML | Refills: 0
Start: 2018-05-14 | End: 2018-05-14 | Stop reason: CLARIF

## 2018-05-14 RX ORDER — METHYLPREDNISOLONE ACETATE 40 MG/ML
40 INJECTION, SUSPENSION INTRA-ARTICULAR; INTRALESIONAL; INTRAMUSCULAR; SOFT TISSUE ONCE
Status: COMPLETED | OUTPATIENT
Start: 2018-05-14 | End: 2018-05-14

## 2018-05-14 RX ADMIN — METHYLPREDNISOLONE ACETATE 40 MG: 40 INJECTION, SUSPENSION INTRA-ARTICULAR; INTRALESIONAL; INTRAMUSCULAR; SOFT TISSUE at 15:04

## 2018-05-22 ENCOUNTER — NURSE ONLY (OUTPATIENT)
Dept: ENT CLINIC | Age: 63
End: 2018-05-22

## 2018-05-22 DIAGNOSIS — J30.2 CHRONIC SEASONAL ALLERGIC RHINITIS DUE TO OTHER ALLERGEN: ICD-10-CM

## 2018-05-22 PROCEDURE — 95117 IMMUNOTHERAPY INJECTIONS: CPT | Performed by: OTOLARYNGOLOGY

## 2018-05-29 ENCOUNTER — NURSE ONLY (OUTPATIENT)
Dept: ENT CLINIC | Age: 63
End: 2018-05-29

## 2018-05-29 DIAGNOSIS — J30.2 CHRONIC SEASONAL ALLERGIC RHINITIS DUE TO OTHER ALLERGEN: ICD-10-CM

## 2018-05-29 PROCEDURE — 95117 IMMUNOTHERAPY INJECTIONS: CPT | Performed by: OTOLARYNGOLOGY

## 2018-06-05 ENCOUNTER — NURSE ONLY (OUTPATIENT)
Dept: ENT CLINIC | Age: 63
End: 2018-06-05

## 2018-06-05 DIAGNOSIS — J30.2 CHRONIC SEASONAL ALLERGIC RHINITIS DUE TO OTHER ALLERGEN: ICD-10-CM

## 2018-06-05 PROCEDURE — 95117 IMMUNOTHERAPY INJECTIONS: CPT | Performed by: OTOLARYNGOLOGY

## 2018-06-11 ENCOUNTER — NURSE ONLY (OUTPATIENT)
Dept: ENT CLINIC | Age: 63
End: 2018-06-11

## 2018-06-11 DIAGNOSIS — J30.2 CHRONIC SEASONAL ALLERGIC RHINITIS DUE TO OTHER ALLERGEN: ICD-10-CM

## 2018-06-11 PROCEDURE — 95117 IMMUNOTHERAPY INJECTIONS: CPT | Performed by: OTOLARYNGOLOGY

## 2018-06-14 RX ORDER — TAMSULOSIN HYDROCHLORIDE 0.4 MG/1
CAPSULE ORAL
Qty: 30 CAPSULE | Refills: 4 | Status: SHIPPED | OUTPATIENT
Start: 2018-06-14 | End: 2018-11-18 | Stop reason: SDUPTHER

## 2018-06-14 RX ORDER — ATORVASTATIN CALCIUM 20 MG/1
TABLET, FILM COATED ORAL
Qty: 30 TABLET | Refills: 4 | Status: SHIPPED | OUTPATIENT
Start: 2018-06-14 | End: 2018-11-18 | Stop reason: SDUPTHER

## 2018-06-18 ENCOUNTER — NURSE ONLY (OUTPATIENT)
Dept: ENT CLINIC | Age: 63
End: 2018-06-18

## 2018-06-18 DIAGNOSIS — J30.2 CHRONIC SEASONAL ALLERGIC RHINITIS DUE TO OTHER ALLERGEN: ICD-10-CM

## 2018-06-18 PROCEDURE — 95117 IMMUNOTHERAPY INJECTIONS: CPT | Performed by: OTOLARYNGOLOGY

## 2018-06-27 ENCOUNTER — NURSE ONLY (OUTPATIENT)
Dept: ENT CLINIC | Age: 63
End: 2018-06-27

## 2018-06-27 DIAGNOSIS — J30.2 CHRONIC SEASONAL ALLERGIC RHINITIS DUE TO OTHER ALLERGEN: ICD-10-CM

## 2018-06-27 PROCEDURE — 95117 IMMUNOTHERAPY INJECTIONS: CPT | Performed by: OTOLARYNGOLOGY

## 2018-07-03 ENCOUNTER — NURSE ONLY (OUTPATIENT)
Dept: ENT CLINIC | Age: 63
End: 2018-07-03

## 2018-07-03 DIAGNOSIS — J30.2 CHRONIC SEASONAL ALLERGIC RHINITIS DUE TO OTHER ALLERGEN: ICD-10-CM

## 2018-07-03 PROCEDURE — 95117 IMMUNOTHERAPY INJECTIONS: CPT | Performed by: OTOLARYNGOLOGY

## 2018-07-09 ENCOUNTER — NURSE ONLY (OUTPATIENT)
Dept: ENT CLINIC | Age: 63
End: 2018-07-09

## 2018-07-09 DIAGNOSIS — J30.2 CHRONIC SEASONAL ALLERGIC RHINITIS DUE TO OTHER ALLERGEN: ICD-10-CM

## 2018-07-09 PROCEDURE — 95117 IMMUNOTHERAPY INJECTIONS: CPT | Performed by: OTOLARYNGOLOGY

## 2018-07-16 ENCOUNTER — NURSE ONLY (OUTPATIENT)
Dept: ENT CLINIC | Age: 63
End: 2018-07-16

## 2018-07-16 DIAGNOSIS — J30.2 CHRONIC SEASONAL ALLERGIC RHINITIS DUE TO OTHER ALLERGEN: ICD-10-CM

## 2018-07-16 PROCEDURE — 95117 IMMUNOTHERAPY INJECTIONS: CPT | Performed by: OTOLARYNGOLOGY

## 2018-07-16 NOTE — PROGRESS NOTES
After obtaining consent, and per orders of Dr Jaylen Clark, injections of allergy serum given in right arm by Solis Bobby. Patient instructed to remain in clinic for 20 minutes afterwards, and to report any adverse reaction to me immediately.

## 2018-07-24 ENCOUNTER — NURSE ONLY (OUTPATIENT)
Dept: ENT CLINIC | Age: 63
End: 2018-07-24

## 2018-07-24 DIAGNOSIS — J30.2 CHRONIC SEASONAL ALLERGIC RHINITIS DUE TO OTHER ALLERGEN: ICD-10-CM

## 2018-07-24 PROCEDURE — 95117 IMMUNOTHERAPY INJECTIONS: CPT | Performed by: OTOLARYNGOLOGY

## 2018-07-27 ENCOUNTER — OFFICE VISIT (OUTPATIENT)
Dept: INTERNAL MEDICINE CLINIC | Age: 63
End: 2018-07-27

## 2018-07-27 VITALS
HEIGHT: 66 IN | HEART RATE: 68 BPM | WEIGHT: 218.4 LBS | DIASTOLIC BLOOD PRESSURE: 84 MMHG | SYSTOLIC BLOOD PRESSURE: 124 MMHG | BODY MASS INDEX: 35.1 KG/M2

## 2018-07-27 DIAGNOSIS — E11.9 TYPE 2 DIABETES MELLITUS WITHOUT COMPLICATION, WITHOUT LONG-TERM CURRENT USE OF INSULIN (HCC): Primary | ICD-10-CM

## 2018-07-27 DIAGNOSIS — E78.00 HYPERCHOLESTEREMIA: ICD-10-CM

## 2018-07-27 DIAGNOSIS — N40.1 BENIGN PROSTATIC HYPERPLASIA WITH WEAK URINARY STREAM: ICD-10-CM

## 2018-07-27 DIAGNOSIS — R39.12 BENIGN PROSTATIC HYPERPLASIA WITH WEAK URINARY STREAM: ICD-10-CM

## 2018-07-27 LAB
ALBUMIN SERPL-MCNC: 4.3 G/DL (ref 3.4–5)
ANION GAP SERPL CALCULATED.3IONS-SCNC: 12 MMOL/L (ref 3–16)
BUN BLDV-MCNC: 13 MG/DL (ref 7–20)
CALCIUM SERPL-MCNC: 9.1 MG/DL (ref 8.3–10.6)
CHLORIDE BLD-SCNC: 103 MMOL/L (ref 99–110)
CHOLESTEROL, TOTAL: 129 MG/DL (ref 0–199)
CO2: 26 MMOL/L (ref 21–32)
CREAT SERPL-MCNC: 0.8 MG/DL (ref 0.8–1.3)
CREATININE URINE: 157.7 MG/DL (ref 39–259)
GFR AFRICAN AMERICAN: >60
GFR NON-AFRICAN AMERICAN: >60
GLUCOSE BLD-MCNC: 84 MG/DL (ref 70–99)
HBA1C MFR BLD: 6.3 %
HDLC SERPL-MCNC: 56 MG/DL (ref 40–60)
LDL CHOLESTEROL CALCULATED: 61 MG/DL
MICROALBUMIN UR-MCNC: <1.2 MG/DL
MICROALBUMIN/CREAT UR-RTO: NORMAL MG/G (ref 0–30)
PHOSPHORUS: 3.1 MG/DL (ref 2.5–4.9)
POTASSIUM SERPL-SCNC: 3.9 MMOL/L (ref 3.5–5.1)
SODIUM BLD-SCNC: 141 MMOL/L (ref 136–145)
TRIGL SERPL-MCNC: 59 MG/DL (ref 0–150)
VLDLC SERPL CALC-MCNC: 12 MG/DL

## 2018-07-27 PROCEDURE — 83036 HEMOGLOBIN GLYCOSYLATED A1C: CPT | Performed by: INTERNAL MEDICINE

## 2018-07-27 PROCEDURE — 99214 OFFICE O/P EST MOD 30 MIN: CPT | Performed by: INTERNAL MEDICINE

## 2018-07-27 RX ORDER — PANTOPRAZOLE SODIUM 40 MG/1
TABLET, DELAYED RELEASE ORAL
Qty: 30 TABLET | Refills: 5 | Status: SHIPPED | OUTPATIENT
Start: 2018-07-27 | End: 2019-02-19 | Stop reason: SDUPTHER

## 2018-07-27 NOTE — PROGRESS NOTES
appearance, proprioception intact in the bilateral feet        Lab Results   Component Value Date    LABA1C 6.3 07/27/2018     Lab Results   Component Value Date    .2 07/28/2017      Lab Results   Component Value Date    CREATININE 0.7 (L) 07/28/2017    BUN 21 (H) 07/28/2017     07/28/2017    K 3.8 07/28/2017     07/28/2017    CO2 24 07/28/2017      Lab Results   Component Value Date    CHOL 154 07/28/2017    CHOL 196 04/30/2013     Lab Results   Component Value Date    TRIG 58 07/28/2017    TRIG 43 04/30/2013     Lab Results   Component Value Date    HDL 59 07/28/2017    HDL 52 04/30/2013     Lab Results   Component Value Date    LDLCALC 83 07/28/2017    LDLCALC 136 (H) 04/30/2013     Lab Results   Component Value Date    LABVLDL 12 07/28/2017    LABVLDL 9 04/30/2013     No results found for: CHOLHDLRATIO         Assessment:         1. Type 2 diabetes mellitus without complication, without long-term current use of insulin ([facility] [address]  He has good glycemic control  Healthy diet, regular exercise, weight management encouraged. - POCT glycosylated hemoglobin (Hb A1C)  - Renal Function Panel  - Lipid Panel  - MICROALBUMIN / CREATININE URINE RATIO  -  DIABETES FOOT EXAM    2. Hypercholesteremia  Well controlled  The current medical regimen is effective;  continue present plan and medications. 3. Benign prostatic hyperplasia with weak urinary stream  Symptoms are stable  Continue Flomax and finasteride                 Plan:      See above. Return in 6 months or sooner if needed.

## 2018-07-30 ENCOUNTER — NURSE ONLY (OUTPATIENT)
Dept: ENT CLINIC | Age: 63
End: 2018-07-30

## 2018-07-30 DIAGNOSIS — J30.2 CHRONIC SEASONAL ALLERGIC RHINITIS DUE TO OTHER ALLERGEN: ICD-10-CM

## 2018-07-30 PROCEDURE — 95117 IMMUNOTHERAPY INJECTIONS: CPT | Performed by: OTOLARYNGOLOGY

## 2018-07-30 NOTE — PROGRESS NOTES
After obtaining consent, and per orders of Dr Moiz Girard, injections of allergy serum given in  right arm by Marlo Owens. Patient instructed to remain in clinic for 20 minutes afterwards, and to report any adverse reaction to me immediately.

## 2018-08-06 ENCOUNTER — NURSE ONLY (OUTPATIENT)
Dept: ENT CLINIC | Age: 63
End: 2018-08-06

## 2018-08-06 DIAGNOSIS — J30.2 SEASONAL ALLERGIES: ICD-10-CM

## 2018-08-06 PROCEDURE — 95117 IMMUNOTHERAPY INJECTIONS: CPT | Performed by: OTOLARYNGOLOGY

## 2018-08-13 ENCOUNTER — NURSE ONLY (OUTPATIENT)
Dept: ENT CLINIC | Age: 63
End: 2018-08-13

## 2018-08-13 DIAGNOSIS — J30.2 SEASONAL ALLERGIES: ICD-10-CM

## 2018-08-13 PROCEDURE — 95117 IMMUNOTHERAPY INJECTIONS: CPT | Performed by: OTOLARYNGOLOGY

## 2018-08-13 NOTE — PROGRESS NOTES
After obtaining consent, and per orders of Dr Ania Fuentes, injections of allergy serum given in left  arm by Angie Roberto. Patient instructed to remain in clinic for 20 minutes afterwards, and to report any adverse reaction to me immediately.

## 2018-08-20 ENCOUNTER — NURSE ONLY (OUTPATIENT)
Dept: ENT CLINIC | Age: 63
End: 2018-08-20

## 2018-08-20 DIAGNOSIS — J30.2 SEASONAL ALLERGIES: ICD-10-CM

## 2018-08-20 PROCEDURE — 95117 IMMUNOTHERAPY INJECTIONS: CPT | Performed by: OTOLARYNGOLOGY

## 2018-08-20 NOTE — PROGRESS NOTES
After obtaining consent, and per orders of Dr Anh Dean, injections of allergy serum given in right arm by Fern Argueta. Patient instructed to remain in clinic for 20 minutes afterwards, and to report any adverse reaction to me immediately.

## 2018-08-29 ENCOUNTER — NURSE ONLY (OUTPATIENT)
Dept: ENT CLINIC | Age: 63
End: 2018-08-29

## 2018-08-29 DIAGNOSIS — J30.2 SEASONAL ALLERGIES: ICD-10-CM

## 2018-08-29 PROCEDURE — 95117 IMMUNOTHERAPY INJECTIONS: CPT | Performed by: OTOLARYNGOLOGY

## 2018-09-04 ENCOUNTER — NURSE ONLY (OUTPATIENT)
Dept: ENT CLINIC | Age: 63
End: 2018-09-04

## 2018-09-04 DIAGNOSIS — J30.9 ALLERGIC SINUSITIS: ICD-10-CM

## 2018-09-04 PROCEDURE — 95117 IMMUNOTHERAPY INJECTIONS: CPT | Performed by: OTOLARYNGOLOGY

## 2018-09-04 NOTE — PROGRESS NOTES
After obtaining consent, and per orders of Dr Mamta Chacon, injections of allergy serum given in  right arm by Tremayne Pickett. Patient instructed to remain in clinic for 20 minutes afterwards, and to report any adverse reaction to me immediately.

## 2018-09-11 ENCOUNTER — NURSE ONLY (OUTPATIENT)
Dept: ENT CLINIC | Age: 63
End: 2018-09-11

## 2018-09-11 DIAGNOSIS — J30.2 SEASONAL ALLERGIES: ICD-10-CM

## 2018-09-11 PROCEDURE — 95117 IMMUNOTHERAPY INJECTIONS: CPT | Performed by: OTOLARYNGOLOGY

## 2018-09-17 ENCOUNTER — NURSE ONLY (OUTPATIENT)
Dept: ENT CLINIC | Age: 63
End: 2018-09-17

## 2018-09-17 DIAGNOSIS — J30.2 SEASONAL ALLERGIES: ICD-10-CM

## 2018-09-17 PROCEDURE — 95117 IMMUNOTHERAPY INJECTIONS: CPT | Performed by: OTOLARYNGOLOGY

## 2018-09-24 ENCOUNTER — NURSE ONLY (OUTPATIENT)
Dept: ENT CLINIC | Age: 63
End: 2018-09-24
Payer: COMMERCIAL

## 2018-09-24 DIAGNOSIS — J30.2 SEASONAL ALLERGIES: ICD-10-CM

## 2018-09-24 PROCEDURE — 95117 IMMUNOTHERAPY INJECTIONS: CPT | Performed by: OTOLARYNGOLOGY

## 2018-10-01 ENCOUNTER — NURSE ONLY (OUTPATIENT)
Dept: ENT CLINIC | Age: 63
End: 2018-10-01
Payer: COMMERCIAL

## 2018-10-01 DIAGNOSIS — J30.2 SEASONAL ALLERGIES: ICD-10-CM

## 2018-10-01 PROCEDURE — 95117 IMMUNOTHERAPY INJECTIONS: CPT | Performed by: OTOLARYNGOLOGY

## 2018-10-08 ENCOUNTER — NURSE ONLY (OUTPATIENT)
Dept: ENT CLINIC | Age: 63
End: 2018-10-08
Payer: COMMERCIAL

## 2018-10-08 DIAGNOSIS — J30.2 SEASONAL ALLERGIES: ICD-10-CM

## 2018-10-08 PROCEDURE — 95117 IMMUNOTHERAPY INJECTIONS: CPT | Performed by: OTOLARYNGOLOGY

## 2018-10-08 NOTE — PROGRESS NOTES
After obtaining consent, and per orders of Dr José Peña, injections of allergy serum given in  right arm by Marcelino Marshall. Patient instructed to remain in clinic for 20 minutes afterwards, and to report any adverse reaction to me immediately.

## 2018-10-15 ENCOUNTER — NURSE ONLY (OUTPATIENT)
Dept: ENT CLINIC | Age: 63
End: 2018-10-15
Payer: COMMERCIAL

## 2018-10-15 DIAGNOSIS — J30.2 SEASONAL ALLERGIES: ICD-10-CM

## 2018-10-15 PROCEDURE — 95117 IMMUNOTHERAPY INJECTIONS: CPT | Performed by: OTOLARYNGOLOGY

## 2018-10-22 RX ORDER — FINASTERIDE 5 MG/1
TABLET, FILM COATED ORAL
Qty: 30 TABLET | Refills: 5 | Status: SHIPPED | OUTPATIENT
Start: 2018-10-22 | End: 2019-04-16 | Stop reason: SDUPTHER

## 2018-10-23 ENCOUNTER — NURSE ONLY (OUTPATIENT)
Dept: ENT CLINIC | Age: 63
End: 2018-10-23
Payer: COMMERCIAL

## 2018-10-23 DIAGNOSIS — J30.9 ALLERGIC SINUSITIS: ICD-10-CM

## 2018-10-23 PROCEDURE — 95117 IMMUNOTHERAPY INJECTIONS: CPT | Performed by: OTOLARYNGOLOGY

## 2018-10-29 ENCOUNTER — NURSE ONLY (OUTPATIENT)
Dept: ENT CLINIC | Age: 63
End: 2018-10-29
Payer: COMMERCIAL

## 2018-10-29 DIAGNOSIS — J30.2 SEASONAL ALLERGIES: ICD-10-CM

## 2018-10-29 PROCEDURE — 95117 IMMUNOTHERAPY INJECTIONS: CPT | Performed by: OTOLARYNGOLOGY

## 2018-11-05 ENCOUNTER — NURSE ONLY (OUTPATIENT)
Dept: ENT CLINIC | Age: 63
End: 2018-11-05
Payer: COMMERCIAL

## 2018-11-05 DIAGNOSIS — J30.2 SEASONAL ALLERGIES: ICD-10-CM

## 2018-11-05 PROCEDURE — 95117 IMMUNOTHERAPY INJECTIONS: CPT | Performed by: OTOLARYNGOLOGY

## 2018-11-12 ENCOUNTER — NURSE ONLY (OUTPATIENT)
Dept: ENT CLINIC | Age: 63
End: 2018-11-12
Payer: COMMERCIAL

## 2018-11-12 DIAGNOSIS — J30.2 SEASONAL ALLERGIES: ICD-10-CM

## 2018-11-12 PROCEDURE — 95117 IMMUNOTHERAPY INJECTIONS: CPT | Performed by: OTOLARYNGOLOGY

## 2018-11-19 ENCOUNTER — NURSE ONLY (OUTPATIENT)
Dept: ENT CLINIC | Age: 63
End: 2018-11-19
Payer: COMMERCIAL

## 2018-11-19 DIAGNOSIS — J30.2 SEASONAL ALLERGIES: ICD-10-CM

## 2018-11-19 PROCEDURE — 95117 IMMUNOTHERAPY INJECTIONS: CPT | Performed by: OTOLARYNGOLOGY

## 2018-11-19 RX ORDER — ATORVASTATIN CALCIUM 20 MG/1
TABLET, FILM COATED ORAL
Qty: 30 TABLET | Refills: 3 | Status: SHIPPED | OUTPATIENT
Start: 2018-11-19 | End: 2019-03-18 | Stop reason: SDUPTHER

## 2018-11-19 RX ORDER — TAMSULOSIN HYDROCHLORIDE 0.4 MG/1
CAPSULE ORAL
Qty: 30 CAPSULE | Refills: 3 | Status: SHIPPED | OUTPATIENT
Start: 2018-11-19 | End: 2019-03-18 | Stop reason: SDUPTHER

## 2018-11-26 ENCOUNTER — NURSE ONLY (OUTPATIENT)
Dept: ENT CLINIC | Age: 63
End: 2018-11-26
Payer: COMMERCIAL

## 2018-11-26 ENCOUNTER — TELEPHONE (OUTPATIENT)
Dept: ENT CLINIC | Age: 63
End: 2018-11-26

## 2018-11-26 DIAGNOSIS — J30.2 SEASONAL ALLERGIES: ICD-10-CM

## 2018-11-26 DIAGNOSIS — J30.9 ALLERGIC SINUSITIS: Primary | ICD-10-CM

## 2018-11-26 DIAGNOSIS — J32.9 RECURRENT SINUSITIS: ICD-10-CM

## 2018-11-26 PROCEDURE — 95117 IMMUNOTHERAPY INJECTIONS: CPT | Performed by: OTOLARYNGOLOGY

## 2018-11-26 RX ORDER — MONTELUKAST SODIUM 10 MG/1
10 TABLET ORAL NIGHTLY
Qty: 15 TABLET | Refills: 0 | Status: SHIPPED | OUTPATIENT
Start: 2018-11-26 | End: 2019-12-02

## 2018-11-26 RX ORDER — AZITHROMYCIN 250 MG/1
TABLET, FILM COATED ORAL
Qty: 1 PACKET | Refills: 0 | Status: SHIPPED | OUTPATIENT
Start: 2018-11-26 | End: 2019-02-21

## 2018-11-26 NOTE — PROGRESS NOTES
After obtaining consent, and per orders of Dr Tammy Holder, injections of allergy serum given in left  arm by Ankit Dooley. Patient instructed to remain in clinic for 20 minutes afterwards, and to report any adverse reaction to me immediately.

## 2018-11-28 ENCOUNTER — OFFICE VISIT (OUTPATIENT)
Dept: ENT CLINIC | Age: 63
End: 2018-11-28
Payer: COMMERCIAL

## 2018-11-28 VITALS — SYSTOLIC BLOOD PRESSURE: 132 MMHG | DIASTOLIC BLOOD PRESSURE: 74 MMHG | HEART RATE: 105 BPM | OXYGEN SATURATION: 98 %

## 2018-11-28 DIAGNOSIS — J32.9 RECURRENT SINUSITIS: ICD-10-CM

## 2018-11-28 DIAGNOSIS — J30.9 ALLERGIC SINUSITIS: Primary | ICD-10-CM

## 2018-11-28 PROCEDURE — 96372 THER/PROPH/DIAG INJ SC/IM: CPT | Performed by: OTOLARYNGOLOGY

## 2018-11-28 PROCEDURE — 99213 OFFICE O/P EST LOW 20 MIN: CPT | Performed by: OTOLARYNGOLOGY

## 2018-11-28 RX ORDER — METHYLPREDNISOLONE ACETATE 40 MG/ML
40 INJECTION, SUSPENSION INTRA-ARTICULAR; INTRALESIONAL; INTRAMUSCULAR; SOFT TISSUE ONCE
Status: COMPLETED | OUTPATIENT
Start: 2018-11-28 | End: 2018-11-28

## 2018-11-28 RX ORDER — FLUTICASONE PROPIONATE 50 MCG
2 SPRAY, SUSPENSION (ML) NASAL DAILY
Qty: 1 BOTTLE | Refills: 3 | Status: SHIPPED | OUTPATIENT
Start: 2018-11-28 | End: 2019-05-08 | Stop reason: SDUPTHER

## 2018-11-28 RX ORDER — AZITHROMYCIN 250 MG/1
TABLET, FILM COATED ORAL
Qty: 1 PACKET | Refills: 0 | Status: SHIPPED | OUTPATIENT
Start: 2018-11-28 | End: 2019-02-21

## 2018-11-28 RX ADMIN — METHYLPREDNISOLONE ACETATE 40 MG: 40 INJECTION, SUSPENSION INTRA-ARTICULAR; INTRALESIONAL; INTRAMUSCULAR; SOFT TISSUE at 14:27

## 2018-12-03 ENCOUNTER — NURSE ONLY (OUTPATIENT)
Dept: ENT CLINIC | Age: 63
End: 2018-12-03
Payer: COMMERCIAL

## 2018-12-03 DIAGNOSIS — J30.2 SEASONAL ALLERGIES: ICD-10-CM

## 2018-12-03 PROCEDURE — 95117 IMMUNOTHERAPY INJECTIONS: CPT | Performed by: OTOLARYNGOLOGY

## 2018-12-10 ENCOUNTER — NURSE ONLY (OUTPATIENT)
Dept: ENT CLINIC | Age: 63
End: 2018-12-10
Payer: COMMERCIAL

## 2018-12-10 DIAGNOSIS — J30.2 SEASONAL ALLERGIES: ICD-10-CM

## 2018-12-10 PROCEDURE — 95117 IMMUNOTHERAPY INJECTIONS: CPT | Performed by: OTOLARYNGOLOGY

## 2018-12-10 NOTE — PROGRESS NOTES
After obtaining consent, and per orders of Dr Briana Aquino, injections of allergy serum given in left  arm by Fozia Rasmussen. Patient instructed to remain in clinic for 20 minutes afterwards, and to report any adverse reaction to me immediately.

## 2018-12-17 ENCOUNTER — NURSE ONLY (OUTPATIENT)
Dept: ENT CLINIC | Age: 63
End: 2018-12-17
Payer: COMMERCIAL

## 2018-12-17 DIAGNOSIS — J30.2 SEASONAL ALLERGIES: ICD-10-CM

## 2018-12-17 PROCEDURE — 95117 IMMUNOTHERAPY INJECTIONS: CPT | Performed by: OTOLARYNGOLOGY

## 2018-12-18 ENCOUNTER — TELEPHONE (OUTPATIENT)
Dept: ENT CLINIC | Age: 63
End: 2018-12-18

## 2018-12-18 DIAGNOSIS — J20.9 ACUTE BRONCHITIS, UNSPECIFIED ORGANISM: Primary | ICD-10-CM

## 2018-12-18 RX ORDER — PROMETHAZINE HYDROCHLORIDE AND CODEINE PHOSPHATE 6.25; 1 MG/5ML; MG/5ML
5 SYRUP ORAL EVERY 4 HOURS PRN
Qty: 240 ML | Refills: 0 | OUTPATIENT
Start: 2018-12-18 | End: 2018-12-21

## 2018-12-18 RX ORDER — AZITHROMYCIN 250 MG/1
TABLET, FILM COATED ORAL
Qty: 1 PACKET | Refills: 0 | Status: SHIPPED | OUTPATIENT
Start: 2018-12-18 | End: 2019-02-21

## 2018-12-18 NOTE — TELEPHONE ENCOUNTER
Pt has bad cough, asking for cough medicine. Advised may not be answered today. Coughing up green, (per wife). Coughs all night and day. Coughs so hard he almost throws up.    Call wife when done

## 2018-12-26 ENCOUNTER — NURSE ONLY (OUTPATIENT)
Dept: ENT CLINIC | Age: 63
End: 2018-12-26
Payer: COMMERCIAL

## 2018-12-26 DIAGNOSIS — J30.2 SEASONAL ALLERGIES: ICD-10-CM

## 2018-12-26 PROCEDURE — 95117 IMMUNOTHERAPY INJECTIONS: CPT | Performed by: OTOLARYNGOLOGY

## 2018-12-26 NOTE — PROGRESS NOTES
After obtaining consent, and per orders of Dr Reyna Diego, injections of allergy serum given in  right arm by Miguelina Whitley. Patient instructed to remain in clinic for 20 minutes afterwards, and to report any adverse reaction to me immediately.

## 2019-01-03 ENCOUNTER — NURSE ONLY (OUTPATIENT)
Dept: ENT CLINIC | Age: 64
End: 2019-01-03
Payer: COMMERCIAL

## 2019-01-03 DIAGNOSIS — J30.2 SEASONAL ALLERGIES: ICD-10-CM

## 2019-01-03 PROCEDURE — 95117 IMMUNOTHERAPY INJECTIONS: CPT | Performed by: OTOLARYNGOLOGY

## 2019-01-09 ENCOUNTER — NURSE ONLY (OUTPATIENT)
Dept: ENT CLINIC | Age: 64
End: 2019-01-09
Payer: COMMERCIAL

## 2019-01-09 DIAGNOSIS — J30.2 SEASONAL ALLERGIES: ICD-10-CM

## 2019-01-09 PROCEDURE — 95117 IMMUNOTHERAPY INJECTIONS: CPT | Performed by: OTOLARYNGOLOGY

## 2019-01-15 ENCOUNTER — TELEPHONE (OUTPATIENT)
Dept: INTERNAL MEDICINE CLINIC | Age: 64
End: 2019-01-15

## 2019-01-15 ENCOUNTER — NURSE ONLY (OUTPATIENT)
Dept: ENT CLINIC | Age: 64
End: 2019-01-15
Payer: COMMERCIAL

## 2019-01-15 DIAGNOSIS — J30.2 SEASONAL ALLERGIES: ICD-10-CM

## 2019-01-15 PROCEDURE — 95117 IMMUNOTHERAPY INJECTIONS: CPT | Performed by: OTOLARYNGOLOGY

## 2019-01-21 ENCOUNTER — NURSE ONLY (OUTPATIENT)
Dept: ENT CLINIC | Age: 64
End: 2019-01-21
Payer: COMMERCIAL

## 2019-01-21 DIAGNOSIS — J30.2 SEASONAL ALLERGIES: ICD-10-CM

## 2019-01-21 PROCEDURE — 95117 IMMUNOTHERAPY INJECTIONS: CPT | Performed by: OTOLARYNGOLOGY

## 2019-01-28 ENCOUNTER — NURSE ONLY (OUTPATIENT)
Dept: ENT CLINIC | Age: 64
End: 2019-01-28
Payer: COMMERCIAL

## 2019-01-28 DIAGNOSIS — J30.2 SEASONAL ALLERGIES: ICD-10-CM

## 2019-01-28 PROCEDURE — 95117 IMMUNOTHERAPY INJECTIONS: CPT | Performed by: OTOLARYNGOLOGY

## 2019-01-29 ENCOUNTER — TELEPHONE (OUTPATIENT)
Dept: INTERNAL MEDICINE CLINIC | Age: 64
End: 2019-01-29

## 2019-02-04 ENCOUNTER — NURSE ONLY (OUTPATIENT)
Dept: ENT CLINIC | Age: 64
End: 2019-02-04
Payer: COMMERCIAL

## 2019-02-04 DIAGNOSIS — J30.2 SEASONAL ALLERGIES: ICD-10-CM

## 2019-02-04 PROCEDURE — 95117 IMMUNOTHERAPY INJECTIONS: CPT | Performed by: OTOLARYNGOLOGY

## 2019-02-11 ENCOUNTER — NURSE ONLY (OUTPATIENT)
Dept: ENT CLINIC | Age: 64
End: 2019-02-11
Payer: COMMERCIAL

## 2019-02-11 DIAGNOSIS — J30.2 SEASONAL ALLERGIES: ICD-10-CM

## 2019-02-11 PROCEDURE — 95117 IMMUNOTHERAPY INJECTIONS: CPT | Performed by: OTOLARYNGOLOGY

## 2019-02-18 ENCOUNTER — NURSE ONLY (OUTPATIENT)
Dept: ENT CLINIC | Age: 64
End: 2019-02-18
Payer: COMMERCIAL

## 2019-02-18 DIAGNOSIS — J30.2 SEASONAL ALLERGIES: ICD-10-CM

## 2019-02-18 PROCEDURE — 95117 IMMUNOTHERAPY INJECTIONS: CPT | Performed by: OTOLARYNGOLOGY

## 2019-02-19 RX ORDER — PANTOPRAZOLE SODIUM 40 MG/1
TABLET, DELAYED RELEASE ORAL
Qty: 30 TABLET | Refills: 5 | Status: SHIPPED | OUTPATIENT
Start: 2019-02-19 | End: 2019-08-22 | Stop reason: SDUPTHER

## 2019-02-21 ENCOUNTER — OFFICE VISIT (OUTPATIENT)
Dept: INTERNAL MEDICINE CLINIC | Age: 64
End: 2019-02-21
Payer: COMMERCIAL

## 2019-02-21 VITALS
WEIGHT: 234 LBS | HEIGHT: 66 IN | BODY MASS INDEX: 37.61 KG/M2 | HEART RATE: 64 BPM | SYSTOLIC BLOOD PRESSURE: 110 MMHG | DIASTOLIC BLOOD PRESSURE: 66 MMHG

## 2019-02-21 DIAGNOSIS — E11.9 TYPE 2 DIABETES MELLITUS WITHOUT COMPLICATION, WITHOUT LONG-TERM CURRENT USE OF INSULIN (HCC): Primary | ICD-10-CM

## 2019-02-21 DIAGNOSIS — G47.33 OSA (OBSTRUCTIVE SLEEP APNEA): ICD-10-CM

## 2019-02-21 DIAGNOSIS — E78.00 HYPERCHOLESTEREMIA: ICD-10-CM

## 2019-02-21 DIAGNOSIS — N40.1 BENIGN PROSTATIC HYPERPLASIA WITH WEAK URINARY STREAM: ICD-10-CM

## 2019-02-21 DIAGNOSIS — J30.2 SEASONAL ALLERGIES: ICD-10-CM

## 2019-02-21 DIAGNOSIS — R39.12 BENIGN PROSTATIC HYPERPLASIA WITH WEAK URINARY STREAM: ICD-10-CM

## 2019-02-21 PROCEDURE — 99213 OFFICE O/P EST LOW 20 MIN: CPT | Performed by: NURSE PRACTITIONER

## 2019-02-21 PROCEDURE — 83036 HEMOGLOBIN GLYCOSYLATED A1C: CPT | Performed by: NURSE PRACTITIONER

## 2019-02-21 ASSESSMENT — ENCOUNTER SYMPTOMS
DIARRHEA: 0
NAUSEA: 0
BLOOD IN STOOL: 0
CHEST TIGHTNESS: 0
CONSTIPATION: 0
VOMITING: 0
ABDOMINAL DISTENTION: 0
EYE PAIN: 0
WHEEZING: 0
SHORTNESS OF BREATH: 0
ABDOMINAL PAIN: 0
COUGH: 0
SINUS PRESSURE: 0
RHINORRHEA: 0
APNEA: 0
BACK PAIN: 0
EYE REDNESS: 0

## 2019-02-21 ASSESSMENT — PATIENT HEALTH QUESTIONNAIRE - PHQ9
SUM OF ALL RESPONSES TO PHQ QUESTIONS 1-9: 0
SUM OF ALL RESPONSES TO PHQ9 QUESTIONS 1 & 2: 0
1. LITTLE INTEREST OR PLEASURE IN DOING THINGS: 0
SUM OF ALL RESPONSES TO PHQ QUESTIONS 1-9: 0
2. FEELING DOWN, DEPRESSED OR HOPELESS: 0

## 2019-02-25 ENCOUNTER — NURSE ONLY (OUTPATIENT)
Dept: ENT CLINIC | Age: 64
End: 2019-02-25
Payer: COMMERCIAL

## 2019-02-25 DIAGNOSIS — J30.2 SEASONAL ALLERGIES: ICD-10-CM

## 2019-02-25 PROCEDURE — 95117 IMMUNOTHERAPY INJECTIONS: CPT | Performed by: OTOLARYNGOLOGY

## 2019-03-04 ENCOUNTER — NURSE ONLY (OUTPATIENT)
Dept: ENT CLINIC | Age: 64
End: 2019-03-04
Payer: COMMERCIAL

## 2019-03-04 DIAGNOSIS — J30.2 SEASONAL ALLERGIES: ICD-10-CM

## 2019-03-04 PROCEDURE — 95117 IMMUNOTHERAPY INJECTIONS: CPT | Performed by: OTOLARYNGOLOGY

## 2019-03-18 ENCOUNTER — NURSE ONLY (OUTPATIENT)
Dept: ENT CLINIC | Age: 64
End: 2019-03-18
Payer: COMMERCIAL

## 2019-03-18 DIAGNOSIS — J30.2 SEASONAL ALLERGIES: ICD-10-CM

## 2019-03-18 PROCEDURE — 95117 IMMUNOTHERAPY INJECTIONS: CPT | Performed by: OTOLARYNGOLOGY

## 2019-03-19 RX ORDER — ATORVASTATIN CALCIUM 20 MG/1
TABLET, FILM COATED ORAL
Qty: 30 TABLET | Refills: 5 | Status: SHIPPED | OUTPATIENT
Start: 2019-03-19 | End: 2019-09-17 | Stop reason: SDUPTHER

## 2019-03-19 RX ORDER — TAMSULOSIN HYDROCHLORIDE 0.4 MG/1
CAPSULE ORAL
Qty: 30 CAPSULE | Refills: 5 | Status: SHIPPED | OUTPATIENT
Start: 2019-03-19 | End: 2019-09-17 | Stop reason: SDUPTHER

## 2019-03-25 ENCOUNTER — NURSE ONLY (OUTPATIENT)
Dept: ENT CLINIC | Age: 64
End: 2019-03-25
Payer: COMMERCIAL

## 2019-03-25 DIAGNOSIS — J30.2 SEASONAL ALLERGIES: ICD-10-CM

## 2019-03-25 PROCEDURE — 95117 IMMUNOTHERAPY INJECTIONS: CPT | Performed by: OTOLARYNGOLOGY

## 2019-04-01 ENCOUNTER — NURSE ONLY (OUTPATIENT)
Dept: ENT CLINIC | Age: 64
End: 2019-04-01
Payer: COMMERCIAL

## 2019-04-01 DIAGNOSIS — J30.2 SEASONAL ALLERGIES: ICD-10-CM

## 2019-04-01 PROCEDURE — 95117 IMMUNOTHERAPY INJECTIONS: CPT | Performed by: OTOLARYNGOLOGY

## 2019-04-01 NOTE — PROGRESS NOTES
After obtaining consent, and per orders of Dr Lurdes Duffy, injections of allergy serum given in left  arm by Hilary Blakely. Patient instructed to remain in clinic for 20 minutes afterwards, and to report any adverse reaction to me immediately.

## 2019-04-08 ENCOUNTER — NURSE ONLY (OUTPATIENT)
Dept: ENT CLINIC | Age: 64
End: 2019-04-08
Payer: COMMERCIAL

## 2019-04-08 DIAGNOSIS — J30.2 SEASONAL ALLERGIES: ICD-10-CM

## 2019-04-08 PROCEDURE — 95117 IMMUNOTHERAPY INJECTIONS: CPT | Performed by: OTOLARYNGOLOGY

## 2019-04-08 NOTE — PROGRESS NOTES
After obtaining consent, and per orders of Dr Serafin Fortune, injections of allergy serum given in  right arm by Kristen Britt. Patient instructed to remain in clinic for 20 minutes afterwards, and to report any adverse reaction to me immediately.

## 2019-04-16 RX ORDER — FINASTERIDE 5 MG/1
TABLET, FILM COATED ORAL
Qty: 30 TABLET | Refills: 5 | Status: SHIPPED | OUTPATIENT
Start: 2019-04-16 | End: 2019-10-13 | Stop reason: SDUPTHER

## 2019-04-17 ENCOUNTER — NURSE ONLY (OUTPATIENT)
Dept: ENT CLINIC | Age: 64
End: 2019-04-17
Payer: COMMERCIAL

## 2019-04-17 DIAGNOSIS — J30.2 SEASONAL ALLERGIES: ICD-10-CM

## 2019-04-17 PROCEDURE — 95117 IMMUNOTHERAPY INJECTIONS: CPT | Performed by: OTOLARYNGOLOGY

## 2019-04-17 NOTE — PROGRESS NOTES
After obtaining consent, and per orders of Dr Rodrigo Dozier, injections of allergy serum given in left  arm by Karen Lee. Patient instructed to remain in clinic for 20 minutes afterwards, and to report any adverse reaction to me immediately.

## 2019-04-29 ENCOUNTER — NURSE ONLY (OUTPATIENT)
Dept: ENT CLINIC | Age: 64
End: 2019-04-29
Payer: COMMERCIAL

## 2019-04-29 DIAGNOSIS — J30.2 SEASONAL ALLERGIES: ICD-10-CM

## 2019-04-29 PROCEDURE — 95117 IMMUNOTHERAPY INJECTIONS: CPT | Performed by: OTOLARYNGOLOGY

## 2019-05-06 ENCOUNTER — NURSE ONLY (OUTPATIENT)
Dept: ENT CLINIC | Age: 64
End: 2019-05-06
Payer: COMMERCIAL

## 2019-05-06 DIAGNOSIS — J30.2 SEASONAL ALLERGIES: ICD-10-CM

## 2019-05-06 PROCEDURE — 95117 IMMUNOTHERAPY INJECTIONS: CPT | Performed by: OTOLARYNGOLOGY

## 2019-05-08 ENCOUNTER — OFFICE VISIT (OUTPATIENT)
Dept: INTERNAL MEDICINE CLINIC | Age: 64
End: 2019-05-08
Payer: COMMERCIAL

## 2019-05-08 VITALS
HEART RATE: 116 BPM | DIASTOLIC BLOOD PRESSURE: 76 MMHG | HEIGHT: 66 IN | BODY MASS INDEX: 36.35 KG/M2 | OXYGEN SATURATION: 98 % | SYSTOLIC BLOOD PRESSURE: 134 MMHG | TEMPERATURE: 98.1 F | WEIGHT: 226.2 LBS

## 2019-05-08 DIAGNOSIS — J30.9 ALLERGIC SINUSITIS: ICD-10-CM

## 2019-05-08 DIAGNOSIS — J06.9 ACUTE URI: Primary | ICD-10-CM

## 2019-05-08 PROCEDURE — 96372 THER/PROPH/DIAG INJ SC/IM: CPT | Performed by: NURSE PRACTITIONER

## 2019-05-08 PROCEDURE — 99213 OFFICE O/P EST LOW 20 MIN: CPT | Performed by: NURSE PRACTITIONER

## 2019-05-08 RX ORDER — FLUTICASONE PROPIONATE 50 MCG
2 SPRAY, SUSPENSION (ML) NASAL DAILY
Qty: 1 BOTTLE | Refills: 3 | Status: SHIPPED | OUTPATIENT
Start: 2019-05-08 | End: 2020-11-03 | Stop reason: ALTCHOICE

## 2019-05-08 RX ORDER — METHYLPREDNISOLONE ACETATE 80 MG/ML
40 INJECTION, SUSPENSION INTRA-ARTICULAR; INTRALESIONAL; INTRAMUSCULAR; SOFT TISSUE ONCE
Status: COMPLETED | OUTPATIENT
Start: 2019-05-08 | End: 2019-05-08

## 2019-05-08 RX ORDER — METHYLPREDNISOLONE ACETATE 40 MG/ML
40 INJECTION, SUSPENSION INTRA-ARTICULAR; INTRALESIONAL; INTRAMUSCULAR; SOFT TISSUE ONCE
Qty: 1 ML | Refills: 0 | Status: SHIPPED | OUTPATIENT
Start: 2019-05-08 | End: 2019-05-08 | Stop reason: CLARIF

## 2019-05-08 RX ORDER — AZITHROMYCIN 250 MG/1
250 TABLET, FILM COATED ORAL SEE ADMIN INSTRUCTIONS
Qty: 6 TABLET | Refills: 0 | Status: SHIPPED | OUTPATIENT
Start: 2019-05-08 | End: 2019-05-13 | Stop reason: ALTCHOICE

## 2019-05-08 RX ADMIN — METHYLPREDNISOLONE ACETATE 40 MG: 80 INJECTION, SUSPENSION INTRA-ARTICULAR; INTRALESIONAL; INTRAMUSCULAR; SOFT TISSUE at 15:41

## 2019-05-08 NOTE — PROGRESS NOTES
HPI:  5/8/2019    This is a 60 y.o.male   Chief Complaint   Patient presents with    Cough     productive cough with sputum, SOB, drainage, head and nasal congestion- started 3-4 days ago, 5/24 he will be having a cyst removed from his left foot but was told he had to get his cough looked at     HPI    Has planned surgery to have csyt removed from left foot. Podiatry advised pt to be seen regarding cough. Started 4+ days ago with cough, sob, chest and head congestion. Getting worse. Some chills. Feeling sick and worse than allergies. Using flonase prn  Using mucinex D without much relief. /76   Pulse 116   Temp 98.1 °F (36.7 °C) (Oral)   Ht 5' 6\" (1.676 m)   Wt 226 lb 3.2 oz (102.6 kg)   SpO2 98%   BMI 36.51 kg/m²     No Known Allergies    Current Outpatient Medications   Medication Sig Dispense Refill    azithromycin (ZITHROMAX) 250 MG tablet Take 1 tablet by mouth See Admin Instructions for 5 days 500mg on day 1 followed by 250mg on days 2 - 5 6 tablet 0    fluticasone (FLONASE) 50 MCG/ACT nasal spray 2 sprays by Nasal route daily 1 Bottle 3    methylPREDNISolone acetate (DEPO-MEDROL) 40 MG/ML injection Inject 1 mL into the muscle once for 1 dose 1 mL 0    finasteride (PROSCAR) 5 MG tablet TAKE ONE TABLET BY MOUTH DAILY 30 tablet 5    atorvastatin (LIPITOR) 20 MG tablet TAKE ONE TABLET BY MOUTH DAILY 30 tablet 5    tamsulosin (FLOMAX) 0.4 MG capsule TAKE ONE CAPSULE BY MOUTH DAILY 30 capsule 5    pantoprazole (PROTONIX) 40 MG tablet TAKE ONE TABLET BY MOUTH DAILY 30 tablet 5    Lancets MISC Test 1 time daily prn 100 each 3    glucose blood VI test strips (TEQUILA CONTOUR TEST) strip 1 each by In Vitro route daily As needed. 100 each 2    UNABLE TO FIND every 7 days.  Allergy shots       montelukast (SINGULAIR) 10 MG tablet Take 1 tablet by mouth nightly 15 tablet 0    triamcinolone (KENALOG) 0.025 % cream Apply topically every 4 hours as needed Apply Topically      clobetasol

## 2019-05-13 ENCOUNTER — OFFICE VISIT (OUTPATIENT)
Dept: INTERNAL MEDICINE CLINIC | Age: 64
End: 2019-05-13
Payer: COMMERCIAL

## 2019-05-13 ENCOUNTER — HOSPITAL ENCOUNTER (OUTPATIENT)
Age: 64
Discharge: HOME | End: 2019-05-13
Payer: COMMERCIAL

## 2019-05-13 ENCOUNTER — OFFICE VISIT (OUTPATIENT)
Dept: ENT CLINIC | Age: 64
End: 2019-05-13
Payer: COMMERCIAL

## 2019-05-13 VITALS
HEIGHT: 66 IN | TEMPERATURE: 97.7 F | BODY MASS INDEX: 36.1 KG/M2 | SYSTOLIC BLOOD PRESSURE: 136 MMHG | DIASTOLIC BLOOD PRESSURE: 84 MMHG | WEIGHT: 224.6 LBS | HEART RATE: 80 BPM

## 2019-05-13 VITALS — HEART RATE: 98 BPM | SYSTOLIC BLOOD PRESSURE: 138 MMHG | OXYGEN SATURATION: 97 % | DIASTOLIC BLOOD PRESSURE: 78 MMHG

## 2019-05-13 DIAGNOSIS — E11.9 TYPE 2 DIABETES MELLITUS WITHOUT COMPLICATION, WITHOUT LONG-TERM CURRENT USE OF INSULIN (HCC): ICD-10-CM

## 2019-05-13 DIAGNOSIS — Z01.818 PRE-OP EXAMINATION: ICD-10-CM

## 2019-05-13 DIAGNOSIS — J30.9 ALLERGIC SINUSITIS: ICD-10-CM

## 2019-05-13 DIAGNOSIS — R39.12 BENIGN PROSTATIC HYPERPLASIA WITH WEAK URINARY STREAM: ICD-10-CM

## 2019-05-13 DIAGNOSIS — N40.1 BENIGN PROSTATIC HYPERPLASIA WITH WEAK URINARY STREAM: ICD-10-CM

## 2019-05-13 DIAGNOSIS — J30.2 SEASONAL ALLERGIES: ICD-10-CM

## 2019-05-13 DIAGNOSIS — M67.40 GANGLION CYST: ICD-10-CM

## 2019-05-13 DIAGNOSIS — Z01.818 PRE-OP EXAMINATION: Primary | ICD-10-CM

## 2019-05-13 DIAGNOSIS — J20.9 ACUTE BRONCHITIS, UNSPECIFIED ORGANISM: Primary | ICD-10-CM

## 2019-05-13 DIAGNOSIS — E78.00 HYPERCHOLESTEREMIA: ICD-10-CM

## 2019-05-13 LAB
ANION GAP SERPL CALCULATED.3IONS-SCNC: 10 MMOL/L (ref 3–16)
BUN BLDV-MCNC: 14 MG/DL (ref 7–20)
CALCIUM SERPL-MCNC: 9.1 MG/DL (ref 8.3–10.6)
CHLORIDE BLD-SCNC: 103 MMOL/L (ref 99–110)
CO2: 27 MMOL/L (ref 21–32)
CREAT SERPL-MCNC: 0.8 MG/DL (ref 0.8–1.3)
CREATININE URINE: 129.2 MG/DL (ref 39–259)
ESTIMATED AVERAGE GLUCOSE: 145.6 MG/DL
GFR AFRICAN AMERICAN: >60
GFR NON-AFRICAN AMERICAN: >60
GLUCOSE BLD-MCNC: 123 MG/DL (ref 70–99)
HBA1C MFR BLD: 6.7 %
HCT VFR BLD CALC: 44.6 % (ref 40.5–52.5)
HEMOGLOBIN: 15 G/DL (ref 13.5–17.5)
MCH RBC QN AUTO: 30 PG (ref 26–34)
MCHC RBC AUTO-ENTMCNC: 33.6 G/DL (ref 31–36)
MCV RBC AUTO: 89.3 FL (ref 80–100)
MICROALBUMIN UR-MCNC: <1.2 MG/DL
MICROALBUMIN/CREAT UR-RTO: NORMAL MG/G (ref 0–30)
PDW BLD-RTO: 13.4 % (ref 12.4–15.4)
PLATELET # BLD: 263 K/UL (ref 135–450)
PMV BLD AUTO: 8.1 FL (ref 5–10.5)
POTASSIUM SERPL-SCNC: 3.7 MMOL/L (ref 3.5–5.1)
RBC # BLD: 5 M/UL (ref 4.2–5.9)
SODIUM BLD-SCNC: 140 MMOL/L (ref 136–145)
WBC # BLD: 5.6 K/UL (ref 4–11)

## 2019-05-13 PROCEDURE — 99243 OFF/OP CNSLTJ NEW/EST LOW 30: CPT | Performed by: NURSE PRACTITIONER

## 2019-05-13 PROCEDURE — 83036 HEMOGLOBIN GLYCOSYLATED A1C: CPT

## 2019-05-13 PROCEDURE — 85027 COMPLETE CBC AUTOMATED: CPT

## 2019-05-13 PROCEDURE — 82043 UR ALBUMIN QUANTITATIVE: CPT

## 2019-05-13 PROCEDURE — 82570 ASSAY OF URINE CREATININE: CPT

## 2019-05-13 PROCEDURE — 36415 COLL VENOUS BLD VENIPUNCTURE: CPT

## 2019-05-13 PROCEDURE — 99213 OFFICE O/P EST LOW 20 MIN: CPT | Performed by: OTOLARYNGOLOGY

## 2019-05-13 PROCEDURE — 80048 BASIC METABOLIC PNL TOTAL CA: CPT

## 2019-05-13 RX ORDER — MONTELUKAST SODIUM 10 MG/1
10 TABLET ORAL NIGHTLY
Qty: 15 TABLET | Refills: 1 | Status: SHIPPED | OUTPATIENT
Start: 2019-05-13 | End: 2020-11-03 | Stop reason: ALTCHOICE

## 2019-05-13 RX ORDER — LEVOFLOXACIN 500 MG/1
500 TABLET, FILM COATED ORAL DAILY
Qty: 10 TABLET | Refills: 0 | Status: SHIPPED | OUTPATIENT
Start: 2019-05-13 | End: 2019-12-02

## 2019-05-13 RX ORDER — PROMETHAZINE HYDROCHLORIDE AND CODEINE PHOSPHATE 6.25; 1 MG/5ML; MG/5ML
5 SYRUP ORAL EVERY 4 HOURS PRN
Qty: 240 ML | Refills: 0 | Status: SHIPPED | OUTPATIENT
Start: 2019-05-13 | End: 2019-05-16

## 2019-05-13 RX ORDER — PREDNISONE 10 MG/1
TABLET ORAL
Qty: 25 TABLET | Refills: 0 | Status: SHIPPED | OUTPATIENT
Start: 2019-05-13 | End: 2019-12-02

## 2019-05-13 ASSESSMENT — ENCOUNTER SYMPTOMS
BACK PAIN: 0
CHEST TIGHTNESS: 0
EYE REDNESS: 0
SHORTNESS OF BREATH: 0
ABDOMINAL PAIN: 0
SINUS PRESSURE: 0
ABDOMINAL DISTENTION: 0
DIARRHEA: 0
EYE PAIN: 0
WHEEZING: 0
APNEA: 0
COUGH: 0
VOMITING: 0
RHINORRHEA: 0
BLOOD IN STOOL: 0
NAUSEA: 0
CONSTIPATION: 0

## 2019-05-13 NOTE — PROGRESS NOTES
Allergy shots        No current facility-administered medications for this visit. No Known Allergies    Office Visit on 07/27/2018   Component Date Value Ref Range Status    Hemoglobin A1C 07/27/2018 6.3  % Final    Sodium 07/27/2018 141  136 - 145 mmol/L Final    Potassium 07/27/2018 3.9  3.5 - 5.1 mmol/L Final    Chloride 07/27/2018 103  99 - 110 mmol/L Final    CO2 07/27/2018 26  21 - 32 mmol/L Final    Anion Gap 07/27/2018 12  3 - 16 Final    Glucose 07/27/2018 84  70 - 99 mg/dL Final    BUN 07/27/2018 13  7 - 20 mg/dL Final    CREATININE 07/27/2018 0.8  0.8 - 1.3 mg/dL Final    GFR Non- 07/27/2018 >60  >60 Final    Comment: >60 mL/min/1.73m2 EGFR, calc. for ages 25 and older using the  MDRD formula (not corrected for weight), is valid for stable  renal function.  GFR  07/27/2018 >60  >60 Final    Comment: Chronic Kidney Disease: less than 60 ml/min/1.73 sq.m. Kidney Failure: less than 15 ml/min/1.73 sq.m. Results valid for patients 18 years and older.  Calcium 07/27/2018 9.1  8.3 - 10.6 mg/dL Final    Phosphorus 07/27/2018 3.1  2.5 - 4.9 mg/dL Final    Alb 07/27/2018 4.3  3.4 - 5.0 g/dL Final    Cholesterol, Total 07/27/2018 129  0 - 199 mg/dL Final    Triglycerides 07/27/2018 59  0 - 150 mg/dL Final    HDL 07/27/2018 56  40 - 60 mg/dL Final    LDL Calculated 07/27/2018 61  <100 mg/dL Final    VLDL Cholesterol Calculated 07/27/2018 12  Not Established mg/dL Final    Microalbumin, Random Urine 07/27/2018 <1.20  <2.0 mg/dL Final    Creatinine, Ur 07/27/2018 157.7  39.0 - 259.0 mg/dL Final    Microalbumin Creatinine Ratio 07/27/2018 see below  0.0 - 30.0 mg/g Final    Comment: Ratio cannot be calculated since microalbumin level is below  the lower detection limit. Review of Systems   Constitutional: Negative for appetite change, chills, fatigue and fever. HENT: Negative for congestion, ear pain, rhinorrhea and sinus pressure. Normal range of motion. He exhibits no edema. Neurological: He is alert and oriented to person, place, and time. Coordination normal.   Skin: Skin is warm and dry. He is not diaphoretic. No erythema. Psychiatric: He has a normal mood and affect. Thought content normal.     Diagnosis  Assessment and Plan  1. Pre-op examination  Perioperative risk related to the patient's upcoming surgery is considered low. He is cleared for surgery. Pre-op exam was completed on 5/13/19. DVT prophylaxis per surgery team.  Checking labs. - Basic Metabolic Panel; Future  - CBC; Future    2. Ganglion cyst  Stable, continue with planned surgery. 3. Type 2 diabetes mellitus without complication, without long-term current use of insulin (HCC)  Stable, controlled on current regimen. A1C 6.3  - Microalbumin / Creatinine Urine Ratio; Future    4. Hypercholesteremia  Stable, controlled on current regimen. 5. Benign prostatic hyperplasia with weak urinary stream  Stable, controlled on current regimen. 6. Seasonal allergies  Stable, controlled on current regimen.        Keep appt in 3 months for follow up     Electronically signed by BIGG Dickerson CNP on 5/13/2019 at 8:51 AM

## 2019-05-22 ENCOUNTER — NURSE ONLY (OUTPATIENT)
Dept: ENT CLINIC | Age: 64
End: 2019-05-22
Payer: COMMERCIAL

## 2019-05-22 DIAGNOSIS — J30.2 SEASONAL ALLERGIES: ICD-10-CM

## 2019-05-22 PROCEDURE — 95117 IMMUNOTHERAPY INJECTIONS: CPT | Performed by: OTOLARYNGOLOGY

## 2019-05-22 NOTE — PROGRESS NOTES
After obtaining consent, and per orders of Dr Phill Evans, injections of allergy serum given in left  arm by Em Arellano. Patient instructed to remain in clinic for 20 minutes afterwards, and to report any adverse reaction to me immediately.

## 2019-05-28 ENCOUNTER — NURSE ONLY (OUTPATIENT)
Dept: ENT CLINIC | Age: 64
End: 2019-05-28
Payer: COMMERCIAL

## 2019-05-28 DIAGNOSIS — J30.2 SEASONAL ALLERGIES: ICD-10-CM

## 2019-05-28 PROCEDURE — 95117 IMMUNOTHERAPY INJECTIONS: CPT | Performed by: OTOLARYNGOLOGY

## 2019-05-28 RX ORDER — CARVEDILOL 25 MG/1
TABLET, FILM COATED ORAL
Qty: 100 STRIP | Refills: 1 | Status: SHIPPED | OUTPATIENT
Start: 2019-05-28 | End: 2020-06-02

## 2019-05-28 NOTE — PROGRESS NOTES
After obtaining consent, and per orders of Dr Lee Drake, injections of allergy serum given in  right arm by Rosa Vergara. Patient instructed to remain in clinic for 20 minutes afterwards, and to report any adverse reaction to me immediately.

## 2019-06-03 ENCOUNTER — NURSE ONLY (OUTPATIENT)
Dept: ENT CLINIC | Age: 64
End: 2019-06-03
Payer: COMMERCIAL

## 2019-06-03 DIAGNOSIS — J30.2 SEASONAL ALLERGIES: ICD-10-CM

## 2019-06-03 PROCEDURE — 95117 IMMUNOTHERAPY INJECTIONS: CPT | Performed by: OTOLARYNGOLOGY

## 2019-06-10 ENCOUNTER — NURSE ONLY (OUTPATIENT)
Dept: ENT CLINIC | Age: 64
End: 2019-06-10
Payer: COMMERCIAL

## 2019-06-10 DIAGNOSIS — J30.2 SEASONAL ALLERGIES: ICD-10-CM

## 2019-06-10 PROCEDURE — 95117 IMMUNOTHERAPY INJECTIONS: CPT | Performed by: OTOLARYNGOLOGY

## 2019-06-10 NOTE — PROGRESS NOTES
After obtaining consent, and per orders of Dr Carlton Gannon, injections of allergy serum given in  right arm by Aylin Rangel. Patient instructed to remain in clinic for 20 minutes afterwards, and to report any adverse reaction to me immediately.

## 2019-06-17 ENCOUNTER — NURSE ONLY (OUTPATIENT)
Dept: ENT CLINIC | Age: 64
End: 2019-06-17
Payer: COMMERCIAL

## 2019-06-17 DIAGNOSIS — J30.2 SEASONAL ALLERGIES: ICD-10-CM

## 2019-06-17 PROCEDURE — 95117 IMMUNOTHERAPY INJECTIONS: CPT | Performed by: OTOLARYNGOLOGY

## 2019-06-17 NOTE — PROGRESS NOTES
After obtaining consent, and per orders of Dr Benito Adames, injections of allergy serum given in left  arm by Zeynep Hernandez. Patient instructed to remain in clinic for 20 minutes afterwards, and to report any adverse reaction to me immediately.

## 2019-06-24 ENCOUNTER — NURSE ONLY (OUTPATIENT)
Dept: ENT CLINIC | Age: 64
End: 2019-06-24
Payer: COMMERCIAL

## 2019-06-24 DIAGNOSIS — J30.2 SEASONAL ALLERGIES: ICD-10-CM

## 2019-06-24 PROCEDURE — 95117 IMMUNOTHERAPY INJECTIONS: CPT | Performed by: OTOLARYNGOLOGY

## 2019-07-08 ENCOUNTER — NURSE ONLY (OUTPATIENT)
Dept: ENT CLINIC | Age: 64
End: 2019-07-08
Payer: COMMERCIAL

## 2019-07-08 DIAGNOSIS — J30.9 ALLERGIC SINUSITIS: ICD-10-CM

## 2019-07-08 PROCEDURE — 95117 IMMUNOTHERAPY INJECTIONS: CPT | Performed by: OTOLARYNGOLOGY

## 2019-07-15 ENCOUNTER — NURSE ONLY (OUTPATIENT)
Dept: ENT CLINIC | Age: 64
End: 2019-07-15
Payer: COMMERCIAL

## 2019-07-15 DIAGNOSIS — J30.2 SEASONAL ALLERGIES: ICD-10-CM

## 2019-07-15 PROCEDURE — 95117 IMMUNOTHERAPY INJECTIONS: CPT | Performed by: OTOLARYNGOLOGY

## 2019-07-22 ENCOUNTER — NURSE ONLY (OUTPATIENT)
Dept: ENT CLINIC | Age: 64
End: 2019-07-22
Payer: COMMERCIAL

## 2019-07-22 DIAGNOSIS — J30.2 SEASONAL ALLERGIES: ICD-10-CM

## 2019-07-22 PROCEDURE — 95117 IMMUNOTHERAPY INJECTIONS: CPT | Performed by: OTOLARYNGOLOGY

## 2019-07-29 ENCOUNTER — NURSE ONLY (OUTPATIENT)
Dept: ENT CLINIC | Age: 64
End: 2019-07-29
Payer: COMMERCIAL

## 2019-07-29 DIAGNOSIS — J30.2 SEASONAL ALLERGIES: ICD-10-CM

## 2019-07-29 PROCEDURE — 95117 IMMUNOTHERAPY INJECTIONS: CPT | Performed by: OTOLARYNGOLOGY

## 2019-08-05 ENCOUNTER — NURSE ONLY (OUTPATIENT)
Dept: ENT CLINIC | Age: 64
End: 2019-08-05
Payer: COMMERCIAL

## 2019-08-05 DIAGNOSIS — J30.2 SEASONAL ALLERGIES: ICD-10-CM

## 2019-08-05 PROCEDURE — 95117 IMMUNOTHERAPY INJECTIONS: CPT | Performed by: OTOLARYNGOLOGY

## 2019-08-12 ENCOUNTER — TELEPHONE (OUTPATIENT)
Dept: ENT CLINIC | Age: 64
End: 2019-08-12

## 2019-08-12 ENCOUNTER — NURSE ONLY (OUTPATIENT)
Dept: ENT CLINIC | Age: 64
End: 2019-08-12
Payer: COMMERCIAL

## 2019-08-12 DIAGNOSIS — J30.9 ALLERGIC SINUSITIS: ICD-10-CM

## 2019-08-12 DIAGNOSIS — J32.9 RECURRENT SINUSITIS: Primary | ICD-10-CM

## 2019-08-12 PROCEDURE — 95117 IMMUNOTHERAPY INJECTIONS: CPT | Performed by: OTOLARYNGOLOGY

## 2019-08-12 RX ORDER — AZITHROMYCIN 250 MG/1
TABLET, FILM COATED ORAL
Qty: 1 PACKET | Refills: 0 | Status: SHIPPED | OUTPATIENT
Start: 2019-08-12 | End: 2019-12-02

## 2019-08-12 NOTE — TELEPHONE ENCOUNTER
Patient calling, he says that he is coughing up green stuff, his throat is sore, it started yesterday, asking for rx, please advise    jm

## 2019-08-19 ENCOUNTER — NURSE ONLY (OUTPATIENT)
Dept: ENT CLINIC | Age: 64
End: 2019-08-19
Payer: COMMERCIAL

## 2019-08-19 DIAGNOSIS — J30.9 ALLERGIC SINUSITIS: ICD-10-CM

## 2019-08-19 PROCEDURE — 95117 IMMUNOTHERAPY INJECTIONS: CPT | Performed by: OTOLARYNGOLOGY

## 2019-08-26 ENCOUNTER — NURSE ONLY (OUTPATIENT)
Dept: ENT CLINIC | Age: 64
End: 2019-08-26
Payer: COMMERCIAL

## 2019-08-26 DIAGNOSIS — J30.2 SEASONAL ALLERGIES: ICD-10-CM

## 2019-08-26 PROCEDURE — 95117 IMMUNOTHERAPY INJECTIONS: CPT | Performed by: OTOLARYNGOLOGY

## 2019-09-03 ENCOUNTER — NURSE ONLY (OUTPATIENT)
Dept: ENT CLINIC | Age: 64
End: 2019-09-03
Payer: COMMERCIAL

## 2019-09-03 DIAGNOSIS — J30.2 SEASONAL ALLERGIES: ICD-10-CM

## 2019-09-03 PROCEDURE — 95117 IMMUNOTHERAPY INJECTIONS: CPT | Performed by: OTOLARYNGOLOGY

## 2019-09-09 ENCOUNTER — NURSE ONLY (OUTPATIENT)
Dept: ENT CLINIC | Age: 64
End: 2019-09-09
Payer: COMMERCIAL

## 2019-09-09 DIAGNOSIS — J30.2 SEASONAL ALLERGIES: ICD-10-CM

## 2019-09-09 PROCEDURE — 95117 IMMUNOTHERAPY INJECTIONS: CPT | Performed by: OTOLARYNGOLOGY

## 2019-09-09 NOTE — PROGRESS NOTES
After obtaining consent, and per orders of Dr Stephie Valenzuela, injections of allergy serum given in left  arm by Fawn Pichardo. Patient instructed to remain in clinic for 20 minutes afterwards, and to report any adverse reaction to me immediately.

## 2019-09-16 ENCOUNTER — NURSE ONLY (OUTPATIENT)
Dept: ENT CLINIC | Age: 64
End: 2019-09-16
Payer: COMMERCIAL

## 2019-09-16 DIAGNOSIS — J30.9 ALLERGIC SINUSITIS: ICD-10-CM

## 2019-09-16 PROCEDURE — 95117 IMMUNOTHERAPY INJECTIONS: CPT | Performed by: OTOLARYNGOLOGY

## 2019-09-17 RX ORDER — ATORVASTATIN CALCIUM 20 MG/1
TABLET, FILM COATED ORAL
Qty: 30 TABLET | Refills: 5 | Status: SHIPPED | OUTPATIENT
Start: 2019-09-17 | End: 2020-03-12

## 2019-09-17 RX ORDER — TAMSULOSIN HYDROCHLORIDE 0.4 MG/1
CAPSULE ORAL
Qty: 30 CAPSULE | Refills: 5 | Status: SHIPPED | OUTPATIENT
Start: 2019-09-17 | End: 2020-03-12

## 2019-09-23 ENCOUNTER — NURSE ONLY (OUTPATIENT)
Dept: ENT CLINIC | Age: 64
End: 2019-09-23
Payer: COMMERCIAL

## 2019-09-23 DIAGNOSIS — J30.9 ALLERGIC SINUSITIS: ICD-10-CM

## 2019-09-23 PROCEDURE — 95117 IMMUNOTHERAPY INJECTIONS: CPT | Performed by: OTOLARYNGOLOGY

## 2019-09-23 NOTE — PROGRESS NOTES
After obtaining consent, and per orders of Dr Jess Boss, injections of allergy serum given in left  arm by Laurel Rosado. Patient instructed to remain in clinic for 20 minutes afterwards, and to report any adverse reaction to me immediately.

## 2019-10-01 ENCOUNTER — NURSE ONLY (OUTPATIENT)
Dept: ENT CLINIC | Age: 64
End: 2019-10-01
Payer: COMMERCIAL

## 2019-10-01 DIAGNOSIS — J30.2 SEASONAL ALLERGIES: ICD-10-CM

## 2019-10-01 PROCEDURE — 95117 IMMUNOTHERAPY INJECTIONS: CPT | Performed by: OTOLARYNGOLOGY

## 2019-10-07 ENCOUNTER — NURSE ONLY (OUTPATIENT)
Dept: ENT CLINIC | Age: 64
End: 2019-10-07
Payer: COMMERCIAL

## 2019-10-07 DIAGNOSIS — J30.2 SEASONAL ALLERGIES: ICD-10-CM

## 2019-10-07 PROCEDURE — 95117 IMMUNOTHERAPY INJECTIONS: CPT | Performed by: OTOLARYNGOLOGY

## 2019-10-14 ENCOUNTER — NURSE ONLY (OUTPATIENT)
Dept: ENT CLINIC | Age: 64
End: 2019-10-14
Payer: COMMERCIAL

## 2019-10-14 DIAGNOSIS — J30.2 SEASONAL ALLERGIES: ICD-10-CM

## 2019-10-14 PROCEDURE — 95117 IMMUNOTHERAPY INJECTIONS: CPT | Performed by: OTOLARYNGOLOGY

## 2019-10-14 RX ORDER — FINASTERIDE 5 MG/1
TABLET, FILM COATED ORAL
Qty: 30 TABLET | Refills: 5 | Status: SHIPPED | OUTPATIENT
Start: 2019-10-14 | End: 2020-04-10

## 2019-10-21 ENCOUNTER — NURSE ONLY (OUTPATIENT)
Dept: ENT CLINIC | Age: 64
End: 2019-10-21
Payer: COMMERCIAL

## 2019-10-21 DIAGNOSIS — J30.2 SEASONAL ALLERGIES: ICD-10-CM

## 2019-10-21 PROCEDURE — 95117 IMMUNOTHERAPY INJECTIONS: CPT | Performed by: OTOLARYNGOLOGY

## 2019-10-28 ENCOUNTER — NURSE ONLY (OUTPATIENT)
Dept: ENT CLINIC | Age: 64
End: 2019-10-28
Payer: COMMERCIAL

## 2019-10-28 DIAGNOSIS — J30.2 SEASONAL ALLERGIES: ICD-10-CM

## 2019-10-28 PROCEDURE — 95117 IMMUNOTHERAPY INJECTIONS: CPT | Performed by: OTOLARYNGOLOGY

## 2019-11-06 ENCOUNTER — NURSE ONLY (OUTPATIENT)
Dept: ENT CLINIC | Age: 64
End: 2019-11-06
Payer: COMMERCIAL

## 2019-11-06 DIAGNOSIS — J30.2 SEASONAL ALLERGIES: ICD-10-CM

## 2019-11-06 PROCEDURE — 95117 IMMUNOTHERAPY INJECTIONS: CPT | Performed by: OTOLARYNGOLOGY

## 2019-11-11 ENCOUNTER — NURSE ONLY (OUTPATIENT)
Dept: ENT CLINIC | Age: 64
End: 2019-11-11
Payer: COMMERCIAL

## 2019-11-11 DIAGNOSIS — J30.2 SEASONAL ALLERGIES: ICD-10-CM

## 2019-11-11 PROCEDURE — 95117 IMMUNOTHERAPY INJECTIONS: CPT | Performed by: OTOLARYNGOLOGY

## 2019-11-18 ENCOUNTER — NURSE ONLY (OUTPATIENT)
Dept: ENT CLINIC | Age: 64
End: 2019-11-18
Payer: COMMERCIAL

## 2019-11-18 DIAGNOSIS — J30.2 SEASONAL ALLERGIES: ICD-10-CM

## 2019-11-18 PROCEDURE — 95117 IMMUNOTHERAPY INJECTIONS: CPT | Performed by: OTOLARYNGOLOGY

## 2019-11-25 ENCOUNTER — NURSE ONLY (OUTPATIENT)
Dept: ENT CLINIC | Age: 64
End: 2019-11-25
Payer: COMMERCIAL

## 2019-11-25 DIAGNOSIS — J30.2 SEASONAL ALLERGIES: ICD-10-CM

## 2019-11-25 PROCEDURE — 95117 IMMUNOTHERAPY INJECTIONS: CPT | Performed by: OTOLARYNGOLOGY

## 2019-12-02 ENCOUNTER — NURSE ONLY (OUTPATIENT)
Dept: ENT CLINIC | Age: 64
End: 2019-12-02
Payer: COMMERCIAL

## 2019-12-02 ENCOUNTER — OFFICE VISIT (OUTPATIENT)
Dept: INTERNAL MEDICINE CLINIC | Age: 64
End: 2019-12-02
Payer: COMMERCIAL

## 2019-12-02 VITALS
HEIGHT: 66 IN | HEART RATE: 80 BPM | DIASTOLIC BLOOD PRESSURE: 68 MMHG | SYSTOLIC BLOOD PRESSURE: 136 MMHG | BODY MASS INDEX: 36.9 KG/M2 | WEIGHT: 229.6 LBS

## 2019-12-02 DIAGNOSIS — G47.33 OSA (OBSTRUCTIVE SLEEP APNEA): ICD-10-CM

## 2019-12-02 DIAGNOSIS — E78.00 HYPERCHOLESTEREMIA: ICD-10-CM

## 2019-12-02 DIAGNOSIS — E11.9 TYPE 2 DIABETES MELLITUS WITHOUT COMPLICATION, WITHOUT LONG-TERM CURRENT USE OF INSULIN (HCC): Primary | ICD-10-CM

## 2019-12-02 DIAGNOSIS — N40.1 BENIGN PROSTATIC HYPERPLASIA WITH WEAK URINARY STREAM: ICD-10-CM

## 2019-12-02 DIAGNOSIS — R39.12 BENIGN PROSTATIC HYPERPLASIA WITH WEAK URINARY STREAM: ICD-10-CM

## 2019-12-02 DIAGNOSIS — J30.2 SEASONAL ALLERGIES: ICD-10-CM

## 2019-12-02 LAB — HBA1C MFR BLD: 6.3 %

## 2019-12-02 PROCEDURE — 83036 HEMOGLOBIN GLYCOSYLATED A1C: CPT | Performed by: INTERNAL MEDICINE

## 2019-12-02 PROCEDURE — 99214 OFFICE O/P EST MOD 30 MIN: CPT | Performed by: INTERNAL MEDICINE

## 2019-12-02 PROCEDURE — 95117 IMMUNOTHERAPY INJECTIONS: CPT | Performed by: OTOLARYNGOLOGY

## 2019-12-09 ENCOUNTER — NURSE ONLY (OUTPATIENT)
Dept: ENT CLINIC | Age: 64
End: 2019-12-09
Payer: COMMERCIAL

## 2019-12-09 DIAGNOSIS — J30.2 SEASONAL ALLERGIES: ICD-10-CM

## 2019-12-09 PROCEDURE — 95117 IMMUNOTHERAPY INJECTIONS: CPT | Performed by: OTOLARYNGOLOGY

## 2019-12-16 ENCOUNTER — NURSE ONLY (OUTPATIENT)
Dept: ENT CLINIC | Age: 64
End: 2019-12-16
Payer: COMMERCIAL

## 2019-12-16 DIAGNOSIS — J30.2 SEASONAL ALLERGIES: ICD-10-CM

## 2019-12-16 PROCEDURE — 95117 IMMUNOTHERAPY INJECTIONS: CPT | Performed by: OTOLARYNGOLOGY

## 2019-12-23 ENCOUNTER — NURSE ONLY (OUTPATIENT)
Dept: ENT CLINIC | Age: 64
End: 2019-12-23
Payer: COMMERCIAL

## 2019-12-23 DIAGNOSIS — J30.9 ALLERGIC SINUSITIS: ICD-10-CM

## 2019-12-23 PROCEDURE — 95117 IMMUNOTHERAPY INJECTIONS: CPT | Performed by: OTOLARYNGOLOGY

## 2019-12-30 ENCOUNTER — NURSE ONLY (OUTPATIENT)
Dept: ENT CLINIC | Age: 64
End: 2019-12-30
Payer: COMMERCIAL

## 2019-12-30 DIAGNOSIS — J30.2 SEASONAL ALLERGIES: ICD-10-CM

## 2019-12-30 PROCEDURE — 95117 IMMUNOTHERAPY INJECTIONS: CPT | Performed by: OTOLARYNGOLOGY

## 2020-01-06 ENCOUNTER — NURSE ONLY (OUTPATIENT)
Dept: ENT CLINIC | Age: 65
End: 2020-01-06
Payer: COMMERCIAL

## 2020-01-06 PROCEDURE — 95117 IMMUNOTHERAPY INJECTIONS: CPT | Performed by: OTOLARYNGOLOGY

## 2020-01-06 NOTE — PROGRESS NOTES
After obtaining consent, and per orders of Dr Wicho Benedict, injections of allergy serum given in  right arm by Cruz Bell. Patient instructed to remain in clinic for 20 minutes afterwards, and to report any adverse reaction to me immediately.

## 2020-01-13 ENCOUNTER — NURSE ONLY (OUTPATIENT)
Dept: ENT CLINIC | Age: 65
End: 2020-01-13
Payer: COMMERCIAL

## 2020-01-13 PROCEDURE — 95117 IMMUNOTHERAPY INJECTIONS: CPT | Performed by: OTOLARYNGOLOGY

## 2020-01-16 RX ORDER — PANTOPRAZOLE SODIUM 40 MG/1
TABLET, DELAYED RELEASE ORAL
Qty: 30 TABLET | Refills: 5 | Status: SHIPPED | OUTPATIENT
Start: 2020-01-16 | End: 2020-06-02 | Stop reason: SDUPTHER

## 2020-01-20 ENCOUNTER — NURSE ONLY (OUTPATIENT)
Dept: ENT CLINIC | Age: 65
End: 2020-01-20
Payer: COMMERCIAL

## 2020-01-20 PROCEDURE — 95117 IMMUNOTHERAPY INJECTIONS: CPT | Performed by: OTOLARYNGOLOGY

## 2020-01-20 NOTE — PROGRESS NOTES
After obtaining consent, and per orders of Dr Dean Challenger, injections of allergy serum given in left  arm by Lizzette Mccoy. Patient instructed to remain in clinic for 20 minutes afterwards, and to report any adverse reaction to me immediately.

## 2020-01-27 ENCOUNTER — NURSE ONLY (OUTPATIENT)
Dept: ENT CLINIC | Age: 65
End: 2020-01-27
Payer: COMMERCIAL

## 2020-01-27 PROCEDURE — 95117 IMMUNOTHERAPY INJECTIONS: CPT | Performed by: OTOLARYNGOLOGY

## 2020-02-03 ENCOUNTER — NURSE ONLY (OUTPATIENT)
Dept: ENT CLINIC | Age: 65
End: 2020-02-03
Payer: COMMERCIAL

## 2020-02-03 PROCEDURE — 95117 IMMUNOTHERAPY INJECTIONS: CPT | Performed by: OTOLARYNGOLOGY

## 2020-02-10 ENCOUNTER — NURSE ONLY (OUTPATIENT)
Dept: ENT CLINIC | Age: 65
End: 2020-02-10
Payer: COMMERCIAL

## 2020-02-10 PROCEDURE — 95117 IMMUNOTHERAPY INJECTIONS: CPT | Performed by: OTOLARYNGOLOGY

## 2020-02-10 NOTE — PROGRESS NOTES
After obtaining consent, and per orders of Dr Cruz Lobo, injections of allergy serum given in  right arm by Tamia Boswell. Patient instructed to remain in clinic for 20 minutes afterwards, and to report any adverse reaction to me immediately.

## 2020-02-17 ENCOUNTER — NURSE ONLY (OUTPATIENT)
Dept: ENT CLINIC | Age: 65
End: 2020-02-17
Payer: COMMERCIAL

## 2020-02-17 PROCEDURE — 95117 IMMUNOTHERAPY INJECTIONS: CPT | Performed by: OTOLARYNGOLOGY

## 2020-02-17 NOTE — PROGRESS NOTES
After obtaining consent, and per orders of Dr Aj Tolbert, injections of allergy serum given in left  arm by Leonardo Love. Patient instructed to remain in clinic for 20 minutes afterwards, and to report any adverse reaction to me immediately.

## 2020-03-12 RX ORDER — ATORVASTATIN CALCIUM 20 MG/1
TABLET, FILM COATED ORAL
Qty: 30 TABLET | Refills: 5 | Status: SHIPPED | OUTPATIENT
Start: 2020-03-12 | End: 2020-06-02 | Stop reason: SDUPTHER

## 2020-03-12 RX ORDER — TAMSULOSIN HYDROCHLORIDE 0.4 MG/1
CAPSULE ORAL
Qty: 30 CAPSULE | Refills: 5 | Status: SHIPPED | OUTPATIENT
Start: 2020-03-12 | End: 2020-06-02 | Stop reason: SDUPTHER

## 2020-04-10 RX ORDER — FINASTERIDE 5 MG/1
TABLET, FILM COATED ORAL
Qty: 30 TABLET | Refills: 4 | Status: SHIPPED | OUTPATIENT
Start: 2020-04-10 | End: 2020-06-02 | Stop reason: SDUPTHER

## 2020-06-02 ENCOUNTER — VIRTUAL VISIT (OUTPATIENT)
Dept: INTERNAL MEDICINE CLINIC | Age: 65
End: 2020-06-02
Payer: COMMERCIAL

## 2020-06-02 VITALS
HEART RATE: 65 BPM | SYSTOLIC BLOOD PRESSURE: 145 MMHG | BODY MASS INDEX: 36.8 KG/M2 | WEIGHT: 228 LBS | DIASTOLIC BLOOD PRESSURE: 84 MMHG

## 2020-06-02 PROCEDURE — 99213 OFFICE O/P EST LOW 20 MIN: CPT | Performed by: INTERNAL MEDICINE

## 2020-06-02 RX ORDER — IPRATROPIUM BROMIDE 21 UG/1
2 SPRAY, METERED NASAL 3 TIMES DAILY
COMMUNITY

## 2020-06-02 RX ORDER — FINASTERIDE 5 MG/1
5 TABLET, FILM COATED ORAL DAILY
Qty: 30 TABLET | Refills: 5 | Status: SHIPPED | OUTPATIENT
Start: 2020-06-02 | End: 2021-03-22

## 2020-06-02 RX ORDER — PANTOPRAZOLE SODIUM 40 MG/1
40 TABLET, DELAYED RELEASE ORAL DAILY
Qty: 30 TABLET | Refills: 5 | Status: SHIPPED | OUTPATIENT
Start: 2020-06-02 | End: 2021-01-20

## 2020-06-02 RX ORDER — ATORVASTATIN CALCIUM 20 MG/1
20 TABLET, FILM COATED ORAL DAILY
Qty: 30 TABLET | Refills: 5 | Status: SHIPPED | OUTPATIENT
Start: 2020-06-02 | End: 2021-03-22

## 2020-06-02 RX ORDER — AZELASTINE 1 MG/ML
2 SPRAY, METERED NASAL 2 TIMES DAILY
COMMUNITY
End: 2022-03-21

## 2020-06-02 RX ORDER — TAMSULOSIN HYDROCHLORIDE 0.4 MG/1
0.4 CAPSULE ORAL DAILY
Qty: 30 CAPSULE | Refills: 5 | Status: SHIPPED | OUTPATIENT
Start: 2020-06-02 | End: 2021-03-22

## 2020-06-02 NOTE — PROGRESS NOTES
Kylie Jarrett is a 63 y.o. male evaluated via telephone on 6/2/2020. Consent:  He and/or health care decision maker is aware that that he may receive a bill for this telephone service, depending on his insurance coverage, and has provided verbal consent to proceed: Yes      Documentation:  I communicated with the patient and/or health care decision maker about T2DM, HLD, BPH. Details of this discussion including any medical advice provided:   Type 2 diabetes mellitus: He is not currently on medication. His glucose ranges between . He tries to eat healthy and stay active. Hyperlipidemia: He is taking atorvastatin as directed. He denies side effects. BPH: He is taking his medication as directed. He denies urinary problems. Review of systems:  Intermittent feeling of lightheadedness about once a month or once a week. Denies falling or loss of consciousness. Negative for chest pain  Negative for dyspnea    Lab Results   Component Value Date    LABA1C 6.3 12/02/2019     Lab Results   Component Value Date    .6 05/13/2019      Lab Results   Component Value Date    CREATININE 0.8 05/13/2019    BUN 14 05/13/2019     05/13/2019    K 3.7 05/13/2019     05/13/2019    CO2 27 05/13/2019     Lab Results   Component Value Date    CHOL 129 07/27/2018    CHOL 154 07/28/2017    CHOL 196 04/30/2013     Lab Results   Component Value Date    TRIG 59 07/27/2018    TRIG 58 07/28/2017    TRIG 43 04/30/2013     Lab Results   Component Value Date    HDL 56 07/27/2018    HDL 59 07/28/2017    HDL 52 04/30/2013     Lab Results   Component Value Date    LDLCALC 61 07/27/2018    LDLCALC 83 07/28/2017    LDLCALC 136 (H) 04/30/2013     Lab Results   Component Value Date    LABVLDL 12 07/27/2018    LABVLDL 12 07/28/2017    LABVLDL 9 04/30/2013     No results found for: CHOLHDLRATIO     A/P  1.  Type 2 diabetes mellitus without complication, without long-term current use of insulin ([facility] [address]  He has excellent

## 2020-06-03 LAB
ALBUMIN SERPL-MCNC: 4.4 G/DL (ref 3.4–5)
ANION GAP SERPL CALCULATED.3IONS-SCNC: 9 MMOL/L (ref 3–16)
BUN BLDV-MCNC: 13 MG/DL (ref 7–20)
CALCIUM SERPL-MCNC: 9.2 MG/DL (ref 8.3–10.6)
CHLORIDE BLD-SCNC: 102 MMOL/L (ref 99–110)
CHOLESTEROL, TOTAL: 130 MG/DL (ref 0–199)
CO2: 27 MMOL/L (ref 21–32)
CREAT SERPL-MCNC: 0.8 MG/DL (ref 0.8–1.3)
CREATININE URINE: 166 MG/DL (ref 39–259)
GFR AFRICAN AMERICAN: >60
GFR NON-AFRICAN AMERICAN: >60
GLUCOSE BLD-MCNC: 106 MG/DL (ref 70–99)
HDLC SERPL-MCNC: 46 MG/DL (ref 40–60)
LDL CHOLESTEROL CALCULATED: 71 MG/DL
MICROALBUMIN UR-MCNC: <1.2 MG/DL
MICROALBUMIN/CREAT UR-RTO: NORMAL MG/G (ref 0–30)
PHOSPHORUS: 2.7 MG/DL (ref 2.5–4.9)
POTASSIUM SERPL-SCNC: 3.8 MMOL/L (ref 3.5–5.1)
SODIUM BLD-SCNC: 138 MMOL/L (ref 136–145)
TRIGL SERPL-MCNC: 67 MG/DL (ref 0–150)
VLDLC SERPL CALC-MCNC: 13 MG/DL

## 2020-06-04 LAB
ESTIMATED AVERAGE GLUCOSE: 139.9 MG/DL
HBA1C MFR BLD: 6.5 %

## 2020-06-16 ENCOUNTER — TELEPHONE (OUTPATIENT)
Dept: INTERNAL MEDICINE CLINIC | Age: 65
End: 2020-06-16

## 2020-11-03 ENCOUNTER — TELEMEDICINE (OUTPATIENT)
Dept: INTERNAL MEDICINE CLINIC | Age: 65
End: 2020-11-03
Payer: COMMERCIAL

## 2020-11-03 PROCEDURE — 99214 OFFICE O/P EST MOD 30 MIN: CPT | Performed by: INTERNAL MEDICINE

## 2020-11-03 RX ORDER — LANCETS
1 EACH MISCELLANEOUS 2 TIMES DAILY
Qty: 100 EACH | Refills: 3 | Status: SHIPPED | OUTPATIENT
Start: 2020-11-03

## 2020-11-03 RX ORDER — CARVEDILOL 25 MG/1
1 TABLET, FILM COATED ORAL 2 TIMES DAILY
Qty: 100 EACH | Refills: 3 | Status: SHIPPED | OUTPATIENT
Start: 2020-11-03

## 2020-11-03 ASSESSMENT — PATIENT HEALTH QUESTIONNAIRE - PHQ9
SUM OF ALL RESPONSES TO PHQ QUESTIONS 1-9: 0
1. LITTLE INTEREST OR PLEASURE IN DOING THINGS: 0
SUM OF ALL RESPONSES TO PHQ9 QUESTIONS 1 & 2: 0
SUM OF ALL RESPONSES TO PHQ QUESTIONS 1-9: 0
SUM OF ALL RESPONSES TO PHQ QUESTIONS 1-9: 0
2. FEELING DOWN, DEPRESSED OR HOPELESS: 0

## 2020-11-03 NOTE — PROGRESS NOTES
Subjective:      Patient ID: Kim Lim is a 63 y.o. male. Chief Complaint   Patient presents with    Diabetes     glucose has avg before eating low 90's after around 150     Hyperlipidemia       The patient is returning for management of diabetes, HLD and BPH. He was diagnosed with type 2 diabetes based on hemoglobin A1c of 6.5% obtained in June, 2016. He reports glucose running 90's-150. He denies hypoglycemia. He was walking daily, but had to stop when his knee started to hurt. He lost up to 15 lbs, but has gained some back. HLD: he is taking atorvastatin as directed and tolerating this without difficulty. He also has BPH. He is taking Flomax and finasteride. He still has issues with nocturnal urination and urine leakage after going, but he feels like the medication is helping.     [facility]  He uses CPAP nightly for ALYCIA         Social History     Tobacco Use    Smoking status: Never Smoker    Smokeless tobacco: Never Used   Substance Use Topics    Alcohol use: No     Alcohol/week: 0.0 standard drinks    Drug use: No      Review of Systems      RESP: neg for dyspnea   GI: Reg BM's     Objective:   Physical Exam     Wt Readings from Last 3 Encounters:   06/02/20 228 lb (103.4 kg)   12/02/19 229 lb 9.6 oz (104.1 kg)   05/13/19 224 lb 9.6 oz (101.9 kg)      GEN: WN/WD, NAD            Lab Results   Component Value Date    LABA1C 6.5 06/03/2020     Lab Results   Component Value Date    .9 06/03/2020      Lab Results   Component Value Date    CREATININE 0.8 06/03/2020    BUN 13 06/03/2020     06/03/2020    K 3.8 06/03/2020     06/03/2020    CO2 27 06/03/2020      Lab Results   Component Value Date    CHOL 130 06/03/2020    CHOL 129 07/27/2018    CHOL 154 07/28/2017     Lab Results   Component Value Date    TRIG 67 06/03/2020    TRIG 59 07/27/2018    TRIG 58 07/28/2017     Lab Results   Component Value Date    HDL 46 06/03/2020    HDL 56 07/27/2018    HDL 59 07/28/2017     Lab Results   Component Value Date    LDLCALC 71 06/03/2020    LDLCALC 61 07/27/2018    LDLCALC 83 07/28/2017     Lab Results   Component Value Date    LABVLDL 13 06/03/2020    LABVLDL 12 07/27/2018    LABVLDL 12 07/28/2017     No results found for: CHOLHDLRATIO         Assessment/Plan:         1. Type 2 diabetes mellitus without complication, without long-term current use of insulin (HCC)  Chronic, well controlled  The current medical regimen is effective;  continue present plan and medications. - blood glucose test strips (CONTOUR TEST) strip; 1 each by In Vitro route 2 times daily As needed. Dispense: 100 each; Refill: 3  - Microlet Lancets MISC; 1 each by Does not apply route 2 times daily  Dispense: 100 each; Refill: 3    2. Hypercholesteremia  Chronic, well controlled. The current medical regimen is effective;  continue present plan and medications. 3. Benign prostatic hyperplasia with weak urinary stream  Chronic and stable  Continue Flomax and finasteride    4. ALYCIA (obstructive sleep apnea)  Chronic, doing well on CPAP  The current medical regimen is effective;  continue present plan and medications. See above. Return in 6 months    John Lehman is a 63 y.o. male being evaluated by a Virtual Visit (video visit) encounter to address concerns as mentioned above. A caregiver was present when appropriate. Due to this being a TeleHealth encounter (During ZLLCZ-28 public health emergency), evaluation of the following organ systems was limited: Vitals/Constitutional/EENT/Resp/CV/GI//MS/Neuro/Skin/Heme-Lymph-Imm. Pursuant to the emergency declaration under the [address] [place] [address], [address] [place] [address] and the [employer] and [employer] Act, this Virtual Visit was conducted with patient's (and/or legal guardian's) consent, to reduce the patient's risk of exposure to COVID-19 and provide necessary medical care.   The patient (and/or legal guardian) has also been advised to contact this office for worsening conditions or problems, and seek emergency medical treatment and/or call 911 if deemed necessary. Patient identification was verified at the start of the visit: Yes    Total time spent for this encounter: Not billed by time    Services were provided through a video synchronous discussion virtually to substitute for in-person clinic visit. Patient and provider were located at their individual homes. --Viki Watson MD on 11/3/2020 at 2:17 PM    An electronic signature was used to authenticate this note.

## 2021-01-20 RX ORDER — PANTOPRAZOLE SODIUM 40 MG/1
TABLET, DELAYED RELEASE ORAL
Qty: 30 TABLET | Refills: 5 | Status: SHIPPED | OUTPATIENT
Start: 2021-01-20 | End: 2021-07-26

## 2021-03-22 RX ORDER — FINASTERIDE 5 MG/1
TABLET, FILM COATED ORAL
Qty: 30 TABLET | Refills: 5 | Status: SHIPPED | OUTPATIENT
Start: 2021-03-22 | End: 2021-09-27

## 2021-03-22 RX ORDER — TAMSULOSIN HYDROCHLORIDE 0.4 MG/1
CAPSULE ORAL
Qty: 30 CAPSULE | Refills: 5 | Status: SHIPPED | OUTPATIENT
Start: 2021-03-22 | End: 2021-09-27

## 2021-03-22 RX ORDER — ATORVASTATIN CALCIUM 20 MG/1
TABLET, FILM COATED ORAL
Qty: 30 TABLET | Refills: 5 | Status: SHIPPED | OUTPATIENT
Start: 2021-03-22 | End: 2021-09-27

## 2021-05-10 ENCOUNTER — OFFICE VISIT (OUTPATIENT)
Dept: INTERNAL MEDICINE CLINIC | Age: 66
End: 2021-05-10
Payer: COMMERCIAL

## 2021-05-10 VITALS
HEART RATE: 88 BPM | HEIGHT: 66 IN | BODY MASS INDEX: 35.68 KG/M2 | WEIGHT: 222 LBS | SYSTOLIC BLOOD PRESSURE: 138 MMHG | DIASTOLIC BLOOD PRESSURE: 72 MMHG

## 2021-05-10 DIAGNOSIS — E78.00 HYPERCHOLESTEREMIA: ICD-10-CM

## 2021-05-10 DIAGNOSIS — R10.32 LEFT GROIN PAIN: Primary | ICD-10-CM

## 2021-05-10 DIAGNOSIS — E11.9 TYPE 2 DIABETES MELLITUS WITHOUT COMPLICATION, WITHOUT LONG-TERM CURRENT USE OF INSULIN (HCC): ICD-10-CM

## 2021-05-10 LAB — HBA1C MFR BLD: 6 %

## 2021-05-10 PROCEDURE — 99214 OFFICE O/P EST MOD 30 MIN: CPT | Performed by: INTERNAL MEDICINE

## 2021-05-10 PROCEDURE — 83036 HEMOGLOBIN GLYCOSYLATED A1C: CPT | Performed by: INTERNAL MEDICINE

## 2021-05-10 SDOH — ECONOMIC STABILITY: TRANSPORTATION INSECURITY
IN THE PAST 12 MONTHS, HAS THE LACK OF TRANSPORTATION KEPT YOU FROM MEDICAL APPOINTMENTS OR FROM GETTING MEDICATIONS?: NO

## 2021-05-10 SDOH — ECONOMIC STABILITY: INCOME INSECURITY: HOW HARD IS IT FOR YOU TO PAY FOR THE VERY BASICS LIKE FOOD, HOUSING, MEDICAL CARE, AND HEATING?: NOT ASKED

## 2021-05-10 NOTE — PROGRESS NOTES
Chief Complaint   Patient presents with    Diabetes    Hyperlipidemia    Left groin pain    HPI:   The patient is presenting for chronic disease management and with complaint of left groin pain. He reports a 2-week onset of left groin pain. Initially the pain was severe. It may have been provoked by playing in the yard with his grandchildren. It was located in the left inguinal and scrotal region. He improved with ibuprofen. Pain temporarily resolved. Pain return on Friday after doing some yard work. The pain was less severe on Friday. It was present over the weekend. He took ibuprofen this morning with improvement. Pain is not currently present. He denies changes to bowel or urinary habits. T2DM: He continues with healthy lifestyle for glycemic control. He has been less active over the last 2 weeks due to the above reported issue, but otherwise walks regularly and tries to eat healthy. Hyperlipidemia: He is taking atorvastatin as prescribed. Social History     Tobacco Use    Smoking status: Never Smoker    Smokeless tobacco: Never Used   Substance Use Topics    Alcohol use: No     Alcohol/week: 0.0 standard drinks    Drug use: No        Review of systems:  Negative for chest pain, shortness of breath, urinary changes, change in bowel habits. EXAM:   /72   Pulse 88   Ht 5' 6\" (1.676 m)   Wt 222 lb (100.7 kg)   BMI 35.83 kg/m²    GEN: WN/WD, NAD  CV: regular rate and rhythm, no murmurs rubs or gallops  Resp: normal effort, clear auscultation bilaterally  No peripheral edema   MSK: He has normal range of motion about the left hip. There is no pain with internal or external rotation about the left hip. : There are no testicular masses. There are no scrotal masses. There is no inguinal bulge including with coughing. There is no palpable inguinal lymphadenopathy.   External genitalia are normal in appearance    Lab Results   Component Value Date    LABA1C 6.0 05/10/2021 Lab Results   Component Value Date    .9 06/03/2020      Lab Results   Component Value Date    CHOL 130 06/03/2020    CHOL 129 07/27/2018    CHOL 154 07/28/2017     Lab Results   Component Value Date    TRIG 67 06/03/2020    TRIG 59 07/27/2018    TRIG 58 07/28/2017     Lab Results   Component Value Date    HDL 46 06/03/2020    HDL 56 07/27/2018    HDL 59 07/28/2017     Lab Results   Component Value Date    LDLCALC 71 06/03/2020    LDLCALC 61 07/27/2018    LDLCALC 83 07/28/2017     Lab Results   Component Value Date    LABVLDL 13 06/03/2020    LABVLDL 12 07/27/2018    LABVLDL 12 07/28/2017     No results found for: CHOLHDLRATIO   Lab Results   Component Value Date    CREATININE 0.8 06/03/2020    BUN 13 06/03/2020     06/03/2020    K 3.8 06/03/2020     06/03/2020    CO2 27 06/03/2020         A/P  1. Left groin pain  I suspect musculoligamentous strain. Inguinal hernia is a consideration but less likely. Given his improvement with ibuprofen and absence of abnormal examination findings, I have recommended a trial of conservative management for the next 4 to 6 weeks to include avoidance of painful activities and NSAIDs as needed. If symptoms worsen or persist at 4 to 6 weeks he will notify me and we will plan to obtain a CT scan for additional diagnostic evaluation. 2. Type 2 diabetes mellitus without complication, without long-term current use of insulin (HCC)  Chronic and well controlled. He will continue with healthy lifestyle. - POCT glycosylated hemoglobin (Hb A1C)    3. Hypercholesteremia  Chronic and stable. Blood work is up-to-date. Continue atorvastatin. RTO 4 months or sooner if needed.

## 2021-07-26 RX ORDER — PANTOPRAZOLE SODIUM 40 MG/1
TABLET, DELAYED RELEASE ORAL
Qty: 30 TABLET | Refills: 5 | Status: SHIPPED | OUTPATIENT
Start: 2021-07-26 | End: 2022-01-21

## 2021-09-13 ENCOUNTER — OFFICE VISIT (OUTPATIENT)
Dept: INTERNAL MEDICINE CLINIC | Age: 66
End: 2021-09-13
Payer: COMMERCIAL

## 2021-09-13 VITALS
SYSTOLIC BLOOD PRESSURE: 124 MMHG | HEIGHT: 66 IN | BODY MASS INDEX: 35.32 KG/M2 | WEIGHT: 219.8 LBS | DIASTOLIC BLOOD PRESSURE: 74 MMHG | HEART RATE: 64 BPM

## 2021-09-13 DIAGNOSIS — E11.9 TYPE 2 DIABETES MELLITUS WITHOUT COMPLICATION, WITHOUT LONG-TERM CURRENT USE OF INSULIN (HCC): Primary | ICD-10-CM

## 2021-09-13 DIAGNOSIS — E78.00 HYPERCHOLESTEREMIA: ICD-10-CM

## 2021-09-13 DIAGNOSIS — N40.1 BENIGN PROSTATIC HYPERPLASIA WITH WEAK URINARY STREAM: ICD-10-CM

## 2021-09-13 DIAGNOSIS — R39.12 BENIGN PROSTATIC HYPERPLASIA WITH WEAK URINARY STREAM: ICD-10-CM

## 2021-09-13 LAB — HBA1C MFR BLD: 6.2 %

## 2021-09-13 PROCEDURE — 99214 OFFICE O/P EST MOD 30 MIN: CPT | Performed by: INTERNAL MEDICINE

## 2021-09-13 PROCEDURE — 83036 HEMOGLOBIN GLYCOSYLATED A1C: CPT | Performed by: INTERNAL MEDICINE

## 2021-09-13 ASSESSMENT — PATIENT HEALTH QUESTIONNAIRE - PHQ9
2. FEELING DOWN, DEPRESSED OR HOPELESS: 0
SUM OF ALL RESPONSES TO PHQ QUESTIONS 1-9: 0
SUM OF ALL RESPONSES TO PHQ9 QUESTIONS 1 & 2: 0
SUM OF ALL RESPONSES TO PHQ QUESTIONS 1-9: 0
SUM OF ALL RESPONSES TO PHQ QUESTIONS 1-9: 0
1. LITTLE INTEREST OR PLEASURE IN DOING THINGS: 0

## 2021-09-13 NOTE — PROGRESS NOTES
current regimen is effective;  continue present plan    - POCT glycosylated hemoglobin (Hb A1C)  - Renal Function Panel  - MICROALBUMIN / CREATININE URINE RATIO    2. Hypercholesteremia  Chronic and well controlled. The current medical regimen is effective;  continue present plan and medications. - Lipid Panel    3. Benign prostatic hyperplasia with weak urinary stream  Chronic and well controlled. The current medical regimen is effective;  continue present plan and medications. RTO 6 months or sooner if needed.

## 2021-09-27 RX ORDER — ATORVASTATIN CALCIUM 20 MG/1
TABLET, FILM COATED ORAL
Qty: 30 TABLET | Refills: 5 | Status: SHIPPED | OUTPATIENT
Start: 2021-09-27 | End: 2022-03-22

## 2021-09-27 RX ORDER — TAMSULOSIN HYDROCHLORIDE 0.4 MG/1
CAPSULE ORAL
Qty: 30 CAPSULE | Refills: 5 | Status: SHIPPED | OUTPATIENT
Start: 2021-09-27 | End: 2022-03-22

## 2021-09-27 RX ORDER — FINASTERIDE 5 MG/1
TABLET, FILM COATED ORAL
Qty: 30 TABLET | Refills: 5 | Status: SHIPPED | OUTPATIENT
Start: 2021-09-27 | End: 2022-03-22

## 2022-01-21 RX ORDER — PANTOPRAZOLE SODIUM 40 MG/1
TABLET, DELAYED RELEASE ORAL
Qty: 30 TABLET | Refills: 5 | Status: SHIPPED | OUTPATIENT
Start: 2022-01-21 | End: 2022-07-25 | Stop reason: SDUPTHER

## 2022-03-16 DIAGNOSIS — E11.9 TYPE 2 DIABETES MELLITUS WITHOUT COMPLICATION, WITHOUT LONG-TERM CURRENT USE OF INSULIN (HCC): ICD-10-CM

## 2022-03-16 DIAGNOSIS — E78.00 HYPERCHOLESTEREMIA: Primary | ICD-10-CM

## 2022-03-16 DIAGNOSIS — E78.00 HYPERCHOLESTEREMIA: ICD-10-CM

## 2022-03-16 LAB
ALBUMIN SERPL-MCNC: 4.6 G/DL (ref 3.4–5)
ANION GAP SERPL CALCULATED.3IONS-SCNC: 16 MMOL/L (ref 3–16)
BUN BLDV-MCNC: 16 MG/DL (ref 7–20)
CALCIUM SERPL-MCNC: 9.6 MG/DL (ref 8.3–10.6)
CHLORIDE BLD-SCNC: 100 MMOL/L (ref 99–110)
CHOLESTEROL, TOTAL: 195 MG/DL (ref 0–199)
CO2: 24 MMOL/L (ref 21–32)
CREAT SERPL-MCNC: 0.7 MG/DL (ref 0.8–1.3)
CREATININE URINE: 40.1 MG/DL (ref 39–259)
GFR AFRICAN AMERICAN: >60
GFR NON-AFRICAN AMERICAN: >60
GLUCOSE BLD-MCNC: 94 MG/DL (ref 70–99)
HDLC SERPL-MCNC: 62 MG/DL (ref 40–60)
LDL CHOLESTEROL CALCULATED: 122 MG/DL
MICROALBUMIN UR-MCNC: <1.2 MG/DL
MICROALBUMIN/CREAT UR-RTO: NORMAL MG/G (ref 0–30)
PHOSPHORUS: 2.9 MG/DL (ref 2.5–4.9)
POTASSIUM SERPL-SCNC: 4 MMOL/L (ref 3.5–5.1)
SODIUM BLD-SCNC: 140 MMOL/L (ref 136–145)
TRIGL SERPL-MCNC: 57 MG/DL (ref 0–150)
VLDLC SERPL CALC-MCNC: 11 MG/DL

## 2022-03-21 ENCOUNTER — OFFICE VISIT (OUTPATIENT)
Dept: INTERNAL MEDICINE CLINIC | Age: 67
End: 2022-03-21
Payer: MEDICARE

## 2022-03-21 VITALS
DIASTOLIC BLOOD PRESSURE: 80 MMHG | SYSTOLIC BLOOD PRESSURE: 128 MMHG | WEIGHT: 196 LBS | HEART RATE: 84 BPM | BODY MASS INDEX: 31.5 KG/M2 | HEIGHT: 66 IN

## 2022-03-21 DIAGNOSIS — E11.9 TYPE 2 DIABETES MELLITUS WITHOUT COMPLICATION, WITHOUT LONG-TERM CURRENT USE OF INSULIN (HCC): Primary | ICD-10-CM

## 2022-03-21 DIAGNOSIS — G47.33 OSA (OBSTRUCTIVE SLEEP APNEA): ICD-10-CM

## 2022-03-21 DIAGNOSIS — N40.1 BENIGN PROSTATIC HYPERPLASIA WITH NOCTURIA: ICD-10-CM

## 2022-03-21 DIAGNOSIS — E78.00 HYPERCHOLESTEREMIA: ICD-10-CM

## 2022-03-21 DIAGNOSIS — R35.1 BENIGN PROSTATIC HYPERPLASIA WITH NOCTURIA: ICD-10-CM

## 2022-03-21 LAB — HBA1C MFR BLD: 5.8 %

## 2022-03-21 PROCEDURE — 99214 OFFICE O/P EST MOD 30 MIN: CPT | Performed by: INTERNAL MEDICINE

## 2022-03-21 PROCEDURE — 83036 HEMOGLOBIN GLYCOSYLATED A1C: CPT | Performed by: INTERNAL MEDICINE

## 2022-03-21 PROCEDURE — 3044F HG A1C LEVEL LT 7.0%: CPT | Performed by: INTERNAL MEDICINE

## 2022-03-21 NOTE — PROGRESS NOTES
Chief Complaint   Patient presents with    Diabetes     BS avg around 180    Hyperlipidemia    Benign Prostatic Hypertrophy       HPI:    T2DM: He continues with healthy lifestyle for glycemic control. He continues to lose weight through a healthy lifestlye. He is walking regularly. Glucose is running in the high 100's. Hyperlipidemia: He is taking atorvastatin as prescribed. BPH: taking tamsulosin and finasteride as prescribed. He wakes up 2-3 times per night to urinate. ALYCIA on CPAP: he uses CPAP nightly. He tolerates this well.          Social History     Tobacco Use    Smoking status: Never Smoker    Smokeless tobacco: Never Used   Substance Use Topics    Alcohol use: No     Alcohol/week: 0.0 standard drinks    Drug use: No        Review of systems:  Negative for chest pain, shortness of breath    +trouble sleeping most nights    EXAM:   /80   Pulse 84   Ht 5' 6\" (1.676 m)   Wt 196 lb (88.9 kg)   BMI 31.64 kg/m²    Wt Readings from Last 3 Encounters:   03/21/22 196 lb (88.9 kg)   09/13/21 219 lb 12.8 oz (99.7 kg)   05/10/21 222 lb (100.7 kg)      GEN: WN/WD, NAD  CV: regular rate and rhythm   RESP Nl effort       Lab Results   Component Value Date    LABA1C 5.8 03/21/2022        Lab Results   Component Value Date    CHOL 195 03/16/2022    CHOL 130 06/03/2020    CHOL 129 07/27/2018     Lab Results   Component Value Date    TRIG 57 03/16/2022    TRIG 67 06/03/2020    TRIG 59 07/27/2018     Lab Results   Component Value Date    HDL 62 (H) 03/16/2022    HDL 46 06/03/2020    HDL 56 07/27/2018     Lab Results   Component Value Date    LDLCALC 122 (H) 03/16/2022    LDLCALC 71 06/03/2020    LDLCALC 61 07/27/2018     Lab Results   Component Value Date    LABVLDL 11 03/16/2022    LABVLDL 13 06/03/2020    LABVLDL 12 07/27/2018     No results found for: [facility]   Lab Results   Component Value Date    CREATININE 0.7 (L) 03/16/2022    BUN 16 03/16/2022     03/16/2022    K 4.0 03/16/2022  03/16/2022    CO2 24 03/16/2022         A/P  1. Type 2 diabetes mellitus without complication, without long-term current use of insulin (HCC)  Chronic and stable  Commended on weight loss  Continue healthy lifestyle  - POCT glycosylated hemoglobin (Hb A1C)    2. Hypercholesteremia  Chronic and stable    The current medical regimen is effective;  continue present plan and medications. 3. Benign prostatic hyperplasia with nocturia  Chronic with progressive symptoms. On maximal treatment for BPH  Refer to urology  - Marika Weiner MD, Urology, [facility]    4. ALYCIA:  Chronic stable on CPAP. He is in need of a new sleep specialist.  Will refer to Dr. Justo Capellan. RTO 6 months or sooner if needed.

## 2022-03-22 RX ORDER — ATORVASTATIN CALCIUM 20 MG/1
TABLET, FILM COATED ORAL
Qty: 30 TABLET | Refills: 5 | Status: SHIPPED | OUTPATIENT
Start: 2022-03-22 | End: 2022-09-26 | Stop reason: SDUPTHER

## 2022-03-22 RX ORDER — FINASTERIDE 5 MG/1
TABLET, FILM COATED ORAL
Qty: 30 TABLET | Refills: 5 | Status: SHIPPED | OUTPATIENT
Start: 2022-03-22 | End: 2022-09-19

## 2022-03-22 RX ORDER — TAMSULOSIN HYDROCHLORIDE 0.4 MG/1
CAPSULE ORAL
Qty: 30 CAPSULE | Refills: 5 | Status: SHIPPED | OUTPATIENT
Start: 2022-03-22

## 2022-05-09 ENCOUNTER — PATIENT MESSAGE (OUTPATIENT)
Dept: INTERNAL MEDICINE CLINIC | Age: 67
End: 2022-05-09

## 2022-05-09 DIAGNOSIS — U07.1 COVID-19: Primary | ICD-10-CM

## 2022-05-09 DIAGNOSIS — E11.9 TYPE 2 DIABETES MELLITUS WITHOUT COMPLICATION, WITHOUT LONG-TERM CURRENT USE OF INSULIN (HCC): ICD-10-CM

## 2022-05-09 NOTE — TELEPHONE ENCOUNTER
From: Jo Phillips  To: Dr. Peyman Saul  Sent: 5/9/2022 6:50 AM EDT  Subject: Help! Apparently I have Covid    Morning, Jo Phillips here. Talking makes me cough more, hoping this will work. Wife says there is a character limit, this will be cryptic but hopefully complete. I am willing to travel to office if needed. The issues I most need help with are the cough and keeping my nose clear, especially at night so I can use my cpap as I get NO sleep w/o it. Started on 5/6 w/headache, got much worse as the day went on. Cough, congestion, pain allover including my hands, fingers. NO sleep that night, nose plugged can't use cpap. Miserable on 5/7. Asked wife to buy Mucinex D took first dose 5p. w/1advil. by 7p felt some better.  Limit try to send 2nd email

## 2022-07-26 RX ORDER — PANTOPRAZOLE SODIUM 40 MG/1
40 TABLET, DELAYED RELEASE ORAL DAILY
Qty: 30 TABLET | Refills: 5 | Status: SHIPPED | OUTPATIENT
Start: 2022-07-26

## 2022-08-22 ENCOUNTER — PATIENT MESSAGE (OUTPATIENT)
Dept: INTERNAL MEDICINE CLINIC | Age: 67
End: 2022-08-22

## 2022-09-19 RX ORDER — FINASTERIDE 5 MG/1
TABLET, FILM COATED ORAL
Qty: 90 TABLET | Refills: 1 | Status: SHIPPED
Start: 2022-09-19 | End: 2022-09-26

## 2022-09-19 RX ORDER — FINASTERIDE 5 MG/1
TABLET, FILM COATED ORAL
Qty: 30 TABLET | Refills: 5 | OUTPATIENT
Start: 2022-09-19

## 2022-09-20 SDOH — HEALTH STABILITY: PHYSICAL HEALTH: ON AVERAGE, HOW MANY MINUTES DO YOU ENGAGE IN EXERCISE AT THIS LEVEL?: 40 MIN

## 2022-09-20 SDOH — HEALTH STABILITY: PHYSICAL HEALTH: ON AVERAGE, HOW MANY DAYS PER WEEK DO YOU ENGAGE IN MODERATE TO STRENUOUS EXERCISE (LIKE A BRISK WALK)?: 5 DAYS

## 2022-09-20 ASSESSMENT — LIFESTYLE VARIABLES
HOW OFTEN DO YOU HAVE A DRINK CONTAINING ALCOHOL: NEVER
HOW MANY STANDARD DRINKS CONTAINING ALCOHOL DO YOU HAVE ON A TYPICAL DAY: 0
HOW MANY STANDARD DRINKS CONTAINING ALCOHOL DO YOU HAVE ON A TYPICAL DAY: PATIENT DOES NOT DRINK
HOW OFTEN DO YOU HAVE SIX OR MORE DRINKS ON ONE OCCASION: 1
HOW OFTEN DO YOU HAVE A DRINK CONTAINING ALCOHOL: 1

## 2022-09-26 ENCOUNTER — OFFICE VISIT (OUTPATIENT)
Dept: INTERNAL MEDICINE CLINIC | Age: 67
End: 2022-09-26
Payer: MEDICARE

## 2022-09-26 VITALS
OXYGEN SATURATION: 100 % | DIASTOLIC BLOOD PRESSURE: 82 MMHG | HEART RATE: 90 BPM | HEIGHT: 66 IN | BODY MASS INDEX: 34.1 KG/M2 | SYSTOLIC BLOOD PRESSURE: 130 MMHG | WEIGHT: 212.2 LBS

## 2022-09-26 DIAGNOSIS — E11.9 TYPE 2 DIABETES MELLITUS WITHOUT COMPLICATION, WITHOUT LONG-TERM CURRENT USE OF INSULIN (HCC): Primary | ICD-10-CM

## 2022-09-26 DIAGNOSIS — Z00.00 WELCOME TO MEDICARE PREVENTIVE VISIT: ICD-10-CM

## 2022-09-26 LAB — HBA1C MFR BLD: 6.2 %

## 2022-09-26 PROCEDURE — G0402 INITIAL PREVENTIVE EXAM: HCPCS | Performed by: INTERNAL MEDICINE

## 2022-09-26 PROCEDURE — 3044F HG A1C LEVEL LT 7.0%: CPT | Performed by: INTERNAL MEDICINE

## 2022-09-26 PROCEDURE — 83036 HEMOGLOBIN GLYCOSYLATED A1C: CPT | Performed by: INTERNAL MEDICINE

## 2022-09-26 PROCEDURE — 1123F ACP DISCUSS/DSCN MKR DOCD: CPT | Performed by: INTERNAL MEDICINE

## 2022-09-26 RX ORDER — ATORVASTATIN CALCIUM 20 MG/1
TABLET, FILM COATED ORAL
Qty: 30 TABLET | Refills: 5 | Status: SHIPPED | OUTPATIENT
Start: 2022-09-26

## 2022-09-26 SDOH — ECONOMIC STABILITY: FOOD INSECURITY: WITHIN THE PAST 12 MONTHS, YOU WORRIED THAT YOUR FOOD WOULD RUN OUT BEFORE YOU GOT MONEY TO BUY MORE.: NEVER TRUE

## 2022-09-26 SDOH — ECONOMIC STABILITY: FOOD INSECURITY: WITHIN THE PAST 12 MONTHS, THE FOOD YOU BOUGHT JUST DIDN'T LAST AND YOU DIDN'T HAVE MONEY TO GET MORE.: NEVER TRUE

## 2022-09-26 ASSESSMENT — SOCIAL DETERMINANTS OF HEALTH (SDOH): HOW HARD IS IT FOR YOU TO PAY FOR THE VERY BASICS LIKE FOOD, HOUSING, MEDICAL CARE, AND HEATING?: NOT HARD AT ALL

## 2022-09-26 NOTE — PATIENT INSTRUCTIONS
Personalized Preventive Plan for Maxine Case - 9/26/2022  Medicare offers a range of preventive health benefits. Some of the tests and screenings are paid in full while other may be subject to a deductible, co-insurance, and/or copay. Some of these benefits include a comprehensive review of your medical history including lifestyle, illnesses that may run in your family, and various assessments and screenings as appropriate. After reviewing your medical record and screening and assessments performed today your provider may have ordered immunizations, labs, imaging, and/or referrals for you. A list of these orders (if applicable) as well as your Preventive Care list are included within your After Visit Summary for your review. Other Preventive Recommendations:    A preventive eye exam performed by an eye specialist is recommended every 1-2 years to screen for glaucoma; cataracts, macular degeneration, and other eye disorders. A preventive dental visit is recommended every 6 months. Try to get at least 150 minutes of exercise per week or 10,000 steps per day on a pedometer . Order or download the FREE \"Exercise & Physical Activity: Your Everyday Guide\" from [employer]. Call 0-648.103.3584 or search [employer] online. You need 9710-1661 mg of calcium and 9715-9658 IU of vitamin D per day. It is possible to meet your calcium requirement with diet alone, but a vitamin D supplement is usually necessary to meet this goal.  When exposed to the sun, use a sunscreen that protects against both UVA and UVB radiation with an SPF of 30 or greater. Reapply every 2 to 3 hours or after sweating, drying off with a towel, or swimming. Always wear a seat belt when traveling in a car. Always wear a helmet when riding a bicycle or motorcycle.   Please bring in a copy of your Living Will

## 2022-09-26 NOTE — PROGRESS NOTES
Medicare Annual Wellness Visit    Monika Shields is here for Medicare AWV, Diabetes, Hyperlipidemia, and Benign Prostatic Hypertrophy    Assessment & Plan   Type 2 diabetes mellitus without complication, without long-term current use of insulin (HCC)  -     POCT glycosylated hemoglobin (Hb A1C)    Recommendations for Preventive Services Due: see orders and patient instructions/AVS.  Recommended screening schedule for the next 5-10 years is provided to the patient in written form: see Patient Instructions/AVS.     No follow-ups on file. The 10-year ASCVD risk score (Arun OLIVEIRA, et al., 2019) is: 20.8%    Values used to calculate the score:      Age: 74 years      Sex: Male      Is Non- : No      Diabetic: Yes      Tobacco smoker: No      Systolic Blood Pressure: 870 mmHg      Is BP treated: No      HDL Cholesterol: 62 mg/dL      Total Cholesterol: 195 mg/dL      Subjective       Patient's complete Health Risk Assessment and screening values have been reviewed and are found in Flowsheets. The following problems were reviewed today and where indicated follow up appointments were made and/or referrals ordered.     Positive Risk Factor Screenings with Interventions:             General Health and ACP:  General  In general, how would you say your health is?: Good  In the past 7 days, have you experienced any of the following: New or Increased Pain, New or Increased Fatigue, Loneliness, Social Isolation, Stress or Anger?: No  Do you get the social and emotional support that you need?: Yes  Do you have a Living Will?: Yes    Advance Directives       Power of  Living Will ACP-Advance Directive ACP-Power of     Not on File Not on File Not on File Not on File        General Health Risk Interventions:  Requested copy of Living Will    Health Habits/Nutrition:  Physical Activity: Sufficiently Active    Days of Exercise per Week: 5 days    Minutes of Exercise per Session: 40 min     Have you lost any weight without trying in the past 3 months?: No  Body mass index: (!) 34.25  Have you seen the dentist within the past year?: Yes  Health Habits/Nutrition Interventions:  Diet,exercise, weight management discussed     Safety:  Do you have working smoke detectors?: Yes  Do you have any tripping hazards - loose or unsecured carpets or rugs?: (!) Yes  Do you have any tripping hazards - clutter in doorways, halls, or stairs?: No  Do you have either shower bars, grab bars, non-slip mats or non-slip surfaces in your shower or bathtub?: (!) No  Do all of your stairways have a railing or banister?: Yes  Do you always fasten your seatbelt when you are in a car?: Yes  Safety Interventions:  Home safety tips provided           Objective   Vitals:    09/26/22 0811   BP: 130/82   Pulse: 90   SpO2: 100%   Weight: 212 lb 3.2 oz (96.3 kg)   Height: 5' 6\" (1.676 m)      Body mass index is 34.25 kg/m². GEN: WN/WD, NAD  CV: regular rate and rhythm, no murmurs rubs or gallops  Resp: normal effort, clear auscultation bilaterally  No peripheral edema       Allergies   Allergen Reactions    Midazolam Other (See Comments)     Causes oversedation     Prior to Visit Medications    Medication Sig Taking? Authorizing Provider   finasteride (PROSCAR) 5 MG tablet TAKE ONE TABLET BY MOUTH DAILY Yes Carole Peterson MD   pantoprazole (PROTONIX) 40 MG tablet Take 1 tablet by mouth in the morning. Yes Carole Peterson MD   atorvastatin (LIPITOR) 20 MG tablet TAKE ONE TABLET BY MOUTH DAILY Yes Carole Peterson MD   tamsulosin (FLOMAX) 0.4 MG capsule TAKE ONE CAPSULE BY MOUTH DAILY Yes Carole Peterson MD   blood glucose test strips (CONTOUR TEST) strip 1 each by In Vitro route 2 times daily As needed.  Yes Carole Peterson MD   Microlet Lancets MISC 1 each by Does not apply route 2 times daily Yes Carole Peterson MD   ipratropium (ATROVENT) 0.03 % nasal spray 2 sprays by Each Nostril route 3 times daily Before meals Yes Historical Provider, MD   triamcinolone (KENALOG) 0.025 % cream Apply topically every 4 hours as needed Apply Topically Yes Historical Provider, MD   clobetasol (TEMOVATE) 0.05 % cream Apply topically Daily Apply topically 2 times daily.  Yes Historical Provider, MD   selenium sulfide (SELSUN) 2.5 % lotion  Yes Historical Provider, MD Gamble (Including outside providers/suppliers regularly involved in providing care):   Patient Care Team:  Luciana Meeks MD as PCP - General (Internal Medicine)  Luciana Meeks MD as PCP - REHABILITATION [facility] Empaneled Provider  Luciana Meeks MD (Internal Medicine)     Reviewed and updated this visit:  Allergies  Meds

## 2023-01-18 RX ORDER — PANTOPRAZOLE SODIUM 40 MG/1
TABLET, DELAYED RELEASE ORAL
Qty: 30 TABLET | Refills: 5 | Status: SHIPPED | OUTPATIENT
Start: 2023-01-18

## 2023-03-13 ENCOUNTER — OFFICE VISIT (OUTPATIENT)
Dept: INTERNAL MEDICINE CLINIC | Age: 68
End: 2023-03-13
Payer: MEDICARE

## 2023-03-13 VITALS
HEIGHT: 66 IN | SYSTOLIC BLOOD PRESSURE: 132 MMHG | HEART RATE: 88 BPM | WEIGHT: 227.8 LBS | DIASTOLIC BLOOD PRESSURE: 80 MMHG | BODY MASS INDEX: 36.61 KG/M2

## 2023-03-13 DIAGNOSIS — E11.9 TYPE 2 DIABETES MELLITUS WITHOUT COMPLICATION, WITHOUT LONG-TERM CURRENT USE OF INSULIN (HCC): Primary | ICD-10-CM

## 2023-03-13 LAB — HBA1C MFR BLD: 6 %

## 2023-03-13 PROCEDURE — 3044F HG A1C LEVEL LT 7.0%: CPT | Performed by: INTERNAL MEDICINE

## 2023-03-13 PROCEDURE — 2022F DILAT RTA XM EVC RTNOPTHY: CPT | Performed by: INTERNAL MEDICINE

## 2023-03-13 PROCEDURE — 1123F ACP DISCUSS/DSCN MKR DOCD: CPT | Performed by: INTERNAL MEDICINE

## 2023-03-13 PROCEDURE — G8484 FLU IMMUNIZE NO ADMIN: HCPCS | Performed by: INTERNAL MEDICINE

## 2023-03-13 PROCEDURE — G8427 DOCREV CUR MEDS BY ELIG CLIN: HCPCS | Performed by: INTERNAL MEDICINE

## 2023-03-13 PROCEDURE — 99214 OFFICE O/P EST MOD 30 MIN: CPT | Performed by: INTERNAL MEDICINE

## 2023-03-13 PROCEDURE — 83036 HEMOGLOBIN GLYCOSYLATED A1C: CPT | Performed by: INTERNAL MEDICINE

## 2023-03-13 PROCEDURE — 3017F COLORECTAL CA SCREEN DOC REV: CPT | Performed by: INTERNAL MEDICINE

## 2023-03-13 PROCEDURE — G8417 CALC BMI ABV UP PARAM F/U: HCPCS | Performed by: INTERNAL MEDICINE

## 2023-03-13 PROCEDURE — 1036F TOBACCO NON-USER: CPT | Performed by: INTERNAL MEDICINE

## 2023-03-13 RX ORDER — FINASTERIDE 5 MG/1
5 TABLET, FILM COATED ORAL DAILY
COMMUNITY

## 2023-03-13 SDOH — ECONOMIC STABILITY: FOOD INSECURITY: WITHIN THE PAST 12 MONTHS, YOU WORRIED THAT YOUR FOOD WOULD RUN OUT BEFORE YOU GOT MONEY TO BUY MORE.: NEVER TRUE

## 2023-03-13 SDOH — ECONOMIC STABILITY: HOUSING INSECURITY
IN THE LAST 12 MONTHS, WAS THERE A TIME WHEN YOU DID NOT HAVE A STEADY PLACE TO SLEEP OR SLEPT IN A SHELTER (INCLUDING NOW)?: NO

## 2023-03-13 SDOH — ECONOMIC STABILITY: FOOD INSECURITY: WITHIN THE PAST 12 MONTHS, THE FOOD YOU BOUGHT JUST DIDN'T LAST AND YOU DIDN'T HAVE MONEY TO GET MORE.: NEVER TRUE

## 2023-03-13 SDOH — ECONOMIC STABILITY: INCOME INSECURITY: HOW HARD IS IT FOR YOU TO PAY FOR THE VERY BASICS LIKE FOOD, HOUSING, MEDICAL CARE, AND HEATING?: NOT HARD AT ALL

## 2023-03-13 ASSESSMENT — PATIENT HEALTH QUESTIONNAIRE - PHQ9
2. FEELING DOWN, DEPRESSED OR HOPELESS: 1
SUM OF ALL RESPONSES TO PHQ QUESTIONS 1-9: 1
SUM OF ALL RESPONSES TO PHQ QUESTIONS 1-9: 1
SUM OF ALL RESPONSES TO PHQ9 QUESTIONS 1 & 2: 1
SUM OF ALL RESPONSES TO PHQ QUESTIONS 1-9: 1
1. LITTLE INTEREST OR PLEASURE IN DOING THINGS: 0
SUM OF ALL RESPONSES TO PHQ QUESTIONS 1-9: 1

## 2023-03-13 NOTE — PROGRESS NOTES
Chief Complaint   Patient presents with    Diabetes     BS avg's 120's     Hyperlipidemia    Benign Prostatic Hypertrophy       HPI:    T2DM: He continues with healthy lifestyle for glycemic control. Glucose is running in the 120's on average. His weight had increased over the last several months. Hyperlipidemia: He is taking atorvastatin as prescribed. BPH: taking tamsulosin and finasteride as prescribed. He was off finasteride for several months for unclear reasons. He has resumed this recently. He has at least two episodes of nocturnal urination. ALYCIA on CPAP: he uses CPAP nightly. He tolerates this well.          Social History     Tobacco Use    Smoking status: Never    Smokeless tobacco: Never   Substance Use Topics    Alcohol use: No     Alcohol/week: 0.0 standard drinks    Drug use: No        Review of systems:  CV: Neg for chest pain  RESP: neg for dyspnea       EXAM:   /80   Pulse 88   Ht 5' 6\" (1.676 m)   Wt 227 lb 12.8 oz (103.3 kg)   BMI 36.77 kg/m²    Wt Readings from Last 3 Encounters:   03/13/23 227 lb 12.8 oz (103.3 kg)   09/26/22 212 lb 3.2 oz (96.3 kg)   03/21/22 196 lb (88.9 kg)      GEN: WN/WD, NAD  CV: regular rate and rhythm   RESP Nl effort       Lab Results   Component Value Date    LABA1C 6.2 09/26/2022        Lab Results   Component Value Date    CHOL 195 03/16/2022    CHOL 130 06/03/2020    CHOL 129 07/27/2018     Lab Results   Component Value Date    TRIG 57 03/16/2022    TRIG 67 06/03/2020    TRIG 59 07/27/2018     Lab Results   Component Value Date    HDL 62 (H) 03/16/2022    HDL 46 06/03/2020    HDL 56 07/27/2018     Lab Results   Component Value Date    LDLCALC 122 (H) 03/16/2022    LDLCALC 71 06/03/2020    LDLCALC 61 07/27/2018     Lab Results   Component Value Date    LABVLDL 11 03/16/2022    LABVLDL 13 06/03/2020    LABVLDL 12 07/27/2018     No results found for: CHOLHDLRATIO   Lab Results   Component Value Date    CREATININE 0.7 (L) 03/16/2022    BUN 16 03/16/2022     03/16/2022    K 4.0 03/16/2022     03/16/2022    CO2 24 03/16/2022         A/P  1. Type 2 diabetes mellitus without complication, without long-term current use of insulin (HCC)  Chronic and stable  Encouraged to adhere to healthy lifestyle     - POCT glycosylated hemoglobin (Hb A1C)    2. Hypercholesteremia  Chronic and stable    The current medical regimen is effective;  continue present plan and medications. Atorvastatin 20mg daily    3. Benign prostatic hyperplasia with nocturia  Chronic and stable  Finasteride 5mg daily and tamsulosin 0.4 mg daily                 RTO 6 months or sooner if needed.

## 2023-03-20 RX ORDER — FINASTERIDE 5 MG/1
TABLET, FILM COATED ORAL
Qty: 90 TABLET | OUTPATIENT
Start: 2023-03-20

## 2023-03-23 RX ORDER — FINASTERIDE 5 MG/1
TABLET, FILM COATED ORAL
Qty: 90 TABLET | Refills: 1 | Status: SHIPPED | OUTPATIENT
Start: 2023-03-23

## 2023-03-23 NOTE — TELEPHONE ENCOUNTER
Looks like this was d/c in September but you stated he was taking this at his last visit. Not sure if we should be filling this.

## 2023-03-30 RX ORDER — TAMSULOSIN HYDROCHLORIDE 0.4 MG/1
CAPSULE ORAL
Qty: 30 CAPSULE | Refills: 5 | Status: SHIPPED | OUTPATIENT
Start: 2023-03-30

## 2023-07-26 RX ORDER — PANTOPRAZOLE SODIUM 40 MG/1
40 TABLET, DELAYED RELEASE ORAL EVERY MORNING
Qty: 30 TABLET | Refills: 5 | Status: SHIPPED | OUTPATIENT
Start: 2023-07-26

## 2023-07-26 NOTE — TELEPHONE ENCOUNTER
Last OV: 3/13/2023  Next OV: 9/11/2023    Next appointment due: scheduled NTP with Dr. Albin Carrera

## 2023-08-14 ENCOUNTER — TELEMEDICINE (OUTPATIENT)
Dept: INTERNAL MEDICINE CLINIC | Age: 68
End: 2023-08-14
Payer: MEDICARE

## 2023-08-14 DIAGNOSIS — U07.1 COVID: Primary | ICD-10-CM

## 2023-08-14 DIAGNOSIS — E66.01 SEVERE OBESITY (BMI 35.0-39.9) WITH COMORBIDITY (HCC): ICD-10-CM

## 2023-08-14 PROCEDURE — G8417 CALC BMI ABV UP PARAM F/U: HCPCS | Performed by: INTERNAL MEDICINE

## 2023-08-14 PROCEDURE — G8427 DOCREV CUR MEDS BY ELIG CLIN: HCPCS | Performed by: INTERNAL MEDICINE

## 2023-08-14 PROCEDURE — 1123F ACP DISCUSS/DSCN MKR DOCD: CPT | Performed by: INTERNAL MEDICINE

## 2023-08-14 PROCEDURE — 3017F COLORECTAL CA SCREEN DOC REV: CPT | Performed by: INTERNAL MEDICINE

## 2023-08-14 PROCEDURE — 1036F TOBACCO NON-USER: CPT | Performed by: INTERNAL MEDICINE

## 2023-08-14 PROCEDURE — 99213 OFFICE O/P EST LOW 20 MIN: CPT | Performed by: INTERNAL MEDICINE

## 2023-08-14 ASSESSMENT — ENCOUNTER SYMPTOMS: COUGH: 1

## 2023-08-14 NOTE — PROGRESS NOTES
Jairo Lerma (:  1955) is a 79 y.o. male,Established patient, here for evaluation of the following chief complaint(s):  Drainage, Headache, Cough (Covid positive ), and Fever (98.2F )         ASSESSMENT/PLAN:  1. COVID  -     nirmatrelvir/ritonavir 300/100 (PAXLOVID, 300/100,) 20 x 150 MG & 10 x 100MG TBPK; Take 3 tablets (two 150 mg nirmatrelvir and one 100 mg ritonavir tablets) by mouth every 12 hours for 5 days. , Disp-30 tablet, R-0Normal  2. Severe obesity (BMI 35.0-39. 9) with comorbidity ([address])  Patient is symptomatology is consistent with the COVID and his positive test confirms that at this point patient is advised to drink more fluids we will start him on Paxlovid which she had last year and tolerated very well patient is advised to follow-up in the office in the next few weeks so that he can have his annual assessment    Return if symptoms worsen or fail to improve, for As scheduled.          Subjective   SUBJECTIVE/OBJECTIVE:    Lab Review   Lab Results   Component Value Date/Time     2022 09:05 AM     2020 08:55 AM     2019 08:28 AM    K 4.0 2022 09:05 AM    K 3.8 2020 08:55 AM    K 3.7 2019 08:28 AM    CO2 24 2022 09:05 AM    CO2 27 2020 08:55 AM    CO2 27 2019 08:28 AM    BUN 16 2022 09:05 AM    BUN 13 2020 08:55 AM    BUN 14 2019 08:28 AM    CREATININE 0.7 2022 09:05 AM    CREATININE 0.8 2020 08:55 AM    CREATININE 0.8 2019 08:28 AM    GLUCOSE 94 2022 09:05 AM    GLUCOSE 106 2020 08:55 AM    GLUCOSE 123 2019 08:28 AM    CALCIUM 9.6 2022 09:05 AM    CALCIUM 9.2 2020 08:55 AM    CALCIUM 9.1 2019 08:28 AM     Lab Results   Component Value Date/Time    WBC 5.6 2019 08:28 AM    HGB 15.0 2019 08:28 AM    HCT 44.6 2019 08:28 AM    MCV 89.3 2019 08:28 AM     2019 08:28 AM     Lab Results   Component Value Date/Time    CHOL

## 2023-09-09 SDOH — HEALTH STABILITY: PHYSICAL HEALTH: ON AVERAGE, HOW MANY MINUTES DO YOU ENGAGE IN EXERCISE AT THIS LEVEL?: 30 MIN

## 2023-09-09 SDOH — HEALTH STABILITY: PHYSICAL HEALTH: ON AVERAGE, HOW MANY DAYS PER WEEK DO YOU ENGAGE IN MODERATE TO STRENUOUS EXERCISE (LIKE A BRISK WALK)?: 3 DAYS

## 2023-09-11 ENCOUNTER — OFFICE VISIT (OUTPATIENT)
Dept: INTERNAL MEDICINE CLINIC | Age: 68
End: 2023-09-11
Payer: MEDICARE

## 2023-09-11 VITALS
BODY MASS INDEX: 35.67 KG/M2 | SYSTOLIC BLOOD PRESSURE: 136 MMHG | OXYGEN SATURATION: 99 % | HEART RATE: 88 BPM | DIASTOLIC BLOOD PRESSURE: 80 MMHG | WEIGHT: 221 LBS

## 2023-09-11 DIAGNOSIS — N40.1 BENIGN PROSTATIC HYPERPLASIA WITH WEAK URINARY STREAM: ICD-10-CM

## 2023-09-11 DIAGNOSIS — R39.12 BENIGN PROSTATIC HYPERPLASIA WITH WEAK URINARY STREAM: ICD-10-CM

## 2023-09-11 DIAGNOSIS — Z00.00 MEDICARE ANNUAL WELLNESS VISIT, SUBSEQUENT: Primary | ICD-10-CM

## 2023-09-11 DIAGNOSIS — K21.9 GASTROESOPHAGEAL REFLUX DISEASE WITHOUT ESOPHAGITIS: ICD-10-CM

## 2023-09-11 DIAGNOSIS — E11.9 TYPE 2 DIABETES MELLITUS WITHOUT COMPLICATION, WITHOUT LONG-TERM CURRENT USE OF INSULIN (HCC): ICD-10-CM

## 2023-09-11 DIAGNOSIS — G47.33 OSA (OBSTRUCTIVE SLEEP APNEA): ICD-10-CM

## 2023-09-11 DIAGNOSIS — E78.00 HYPERCHOLESTEREMIA: ICD-10-CM

## 2023-09-11 LAB
ALBUMIN SERPL-MCNC: 4.6 G/DL (ref 3.4–5)
ALBUMIN/GLOB SERPL: 1.9 {RATIO} (ref 1.1–2.2)
ALP SERPL-CCNC: 73 U/L (ref 40–129)
ALT SERPL-CCNC: 30 U/L (ref 10–40)
ANION GAP SERPL CALCULATED.3IONS-SCNC: 11 MMOL/L (ref 3–16)
AST SERPL-CCNC: 19 U/L (ref 15–37)
BASOPHILS # BLD: 0 K/UL (ref 0–0.2)
BASOPHILS NFR BLD: 1 %
BILIRUB SERPL-MCNC: 0.4 MG/DL (ref 0–1)
BUN SERPL-MCNC: 13 MG/DL (ref 7–20)
CALCIUM SERPL-MCNC: 9.7 MG/DL (ref 8.3–10.6)
CHLORIDE SERPL-SCNC: 104 MMOL/L (ref 99–110)
CHOLEST SERPL-MCNC: 218 MG/DL (ref 0–199)
CO2 SERPL-SCNC: 26 MMOL/L (ref 21–32)
CREAT SERPL-MCNC: 0.9 MG/DL (ref 0.8–1.3)
DEPRECATED RDW RBC AUTO: 14.6 % (ref 12.4–15.4)
EOSINOPHIL # BLD: 0.3 K/UL (ref 0–0.6)
EOSINOPHIL NFR BLD: 6.4 %
GFR SERPLBLD CREATININE-BSD FMLA CKD-EPI: >60 ML/MIN/{1.73_M2}
GLUCOSE SERPL-MCNC: 125 MG/DL (ref 70–99)
HBA1C MFR BLD: 6.7 %
HCT VFR BLD AUTO: 42.8 % (ref 40.5–52.5)
HDLC SERPL-MCNC: 48 MG/DL (ref 40–60)
HGB BLD-MCNC: 14.1 G/DL (ref 13.5–17.5)
LDLC SERPL CALC-MCNC: 147 MG/DL
LYMPHOCYTES # BLD: 0.8 K/UL (ref 1–5.1)
LYMPHOCYTES NFR BLD: 16.5 %
MCH RBC QN AUTO: 29.3 PG (ref 26–34)
MCHC RBC AUTO-ENTMCNC: 33 G/DL (ref 31–36)
MCV RBC AUTO: 88.8 FL (ref 80–100)
MONOCYTES # BLD: 0.6 K/UL (ref 0–1.3)
MONOCYTES NFR BLD: 12.2 %
NEUTROPHILS # BLD: 3 K/UL (ref 1.7–7.7)
NEUTROPHILS NFR BLD: 63.9 %
PLATELET # BLD AUTO: 209 K/UL (ref 135–450)
PMV BLD AUTO: 8.5 FL (ref 5–10.5)
POTASSIUM SERPL-SCNC: 3.6 MMOL/L (ref 3.5–5.1)
PROT SERPL-MCNC: 7 G/DL (ref 6.4–8.2)
PSA SERPL DL<=0.01 NG/ML-MCNC: 0.25 NG/ML (ref 0–4)
RBC # BLD AUTO: 4.82 M/UL (ref 4.2–5.9)
SODIUM SERPL-SCNC: 141 MMOL/L (ref 136–145)
TRIGL SERPL-MCNC: 115 MG/DL (ref 0–150)
VLDLC SERPL CALC-MCNC: 23 MG/DL
WBC # BLD AUTO: 4.7 K/UL (ref 4–11)

## 2023-09-11 PROCEDURE — G8427 DOCREV CUR MEDS BY ELIG CLIN: HCPCS | Performed by: INTERNAL MEDICINE

## 2023-09-11 PROCEDURE — G0439 PPPS, SUBSEQ VISIT: HCPCS | Performed by: INTERNAL MEDICINE

## 2023-09-11 PROCEDURE — 1036F TOBACCO NON-USER: CPT | Performed by: INTERNAL MEDICINE

## 2023-09-11 PROCEDURE — 1123F ACP DISCUSS/DSCN MKR DOCD: CPT | Performed by: INTERNAL MEDICINE

## 2023-09-11 PROCEDURE — 99214 OFFICE O/P EST MOD 30 MIN: CPT | Performed by: INTERNAL MEDICINE

## 2023-09-11 PROCEDURE — 2022F DILAT RTA XM EVC RTNOPTHY: CPT | Performed by: INTERNAL MEDICINE

## 2023-09-11 PROCEDURE — G8417 CALC BMI ABV UP PARAM F/U: HCPCS | Performed by: INTERNAL MEDICINE

## 2023-09-11 PROCEDURE — 3017F COLORECTAL CA SCREEN DOC REV: CPT | Performed by: INTERNAL MEDICINE

## 2023-09-11 PROCEDURE — 83036 HEMOGLOBIN GLYCOSYLATED A1C: CPT | Performed by: INTERNAL MEDICINE

## 2023-09-11 PROCEDURE — 3044F HG A1C LEVEL LT 7.0%: CPT | Performed by: INTERNAL MEDICINE

## 2023-09-11 RX ORDER — TAMSULOSIN HYDROCHLORIDE 0.4 MG/1
0.4 CAPSULE ORAL DAILY
Qty: 30 CAPSULE | Refills: 5 | Status: SHIPPED | OUTPATIENT
Start: 2023-09-11

## 2023-09-11 RX ORDER — FINASTERIDE 5 MG/1
5 TABLET, FILM COATED ORAL DAILY
Qty: 90 TABLET | Refills: 1 | Status: SHIPPED | OUTPATIENT
Start: 2023-09-11

## 2023-09-11 ASSESSMENT — LIFESTYLE VARIABLES
HOW MANY STANDARD DRINKS CONTAINING ALCOHOL DO YOU HAVE ON A TYPICAL DAY: PATIENT DOES NOT DRINK
HOW OFTEN DO YOU HAVE A DRINK CONTAINING ALCOHOL: NEVER

## 2023-09-11 ASSESSMENT — PATIENT HEALTH QUESTIONNAIRE - PHQ9
SUM OF ALL RESPONSES TO PHQ QUESTIONS 1-9: 0
SUM OF ALL RESPONSES TO PHQ QUESTIONS 1-9: 0
SUM OF ALL RESPONSES TO PHQ9 QUESTIONS 1 & 2: 0
2. FEELING DOWN, DEPRESSED OR HOPELESS: 0
SUM OF ALL RESPONSES TO PHQ QUESTIONS 1-9: 0
SUM OF ALL RESPONSES TO PHQ QUESTIONS 1-9: 0
1. LITTLE INTEREST OR PLEASURE IN DOING THINGS: 0

## 2023-09-11 ASSESSMENT — ENCOUNTER SYMPTOMS
VISUAL CHANGE: 0
BLURRED VISION: 0
SHORTNESS OF BREATH: 0

## 2023-09-11 NOTE — PROGRESS NOTES
are equal, round, and reactive to light. Neck:      Vascular: No carotid bruit. Cardiovascular:      Rate and Rhythm: Normal rate and regular rhythm. Pulses: Normal pulses. Heart sounds: No murmur heard. Pulmonary:      Effort: Pulmonary effort is normal. No respiratory distress. Breath sounds: Normal breath sounds. Abdominal:      General: Abdomen is flat. Bowel sounds are normal. There is no distension. Palpations: Abdomen is soft. Tenderness: There is no abdominal tenderness. Musculoskeletal:         General: No swelling, tenderness or deformity. Cervical back: Normal range of motion and neck supple. No rigidity or tenderness. Right lower leg: No edema. Left lower leg: No edema. Lymphadenopathy:      Cervical: No cervical adenopathy. Skin:     Coloration: Skin is not jaundiced. Findings: No bruising, erythema or lesion. Neurological:      General: No focal deficit present. Mental Status: He is alert and oriented to person, place, and time. Cranial Nerves: No cranial nerve deficit. Motor: No weakness. Gait: Gait normal.            This dictation was generated by voice recognition computer software. Although all attempts are made to edit the dictation for accuracy, there may be errors in the transcription that are not intended. An electronic signature was used to authenticate this note.     --Christiano Myers MD

## 2023-09-12 LAB
EST. AVERAGE GLUCOSE BLD GHB EST-MCNC: 142.7 MG/DL
HBA1C MFR BLD: 6.6 %

## 2023-09-12 NOTE — RESULT ENCOUNTER NOTE
Please advise patient that the results were acceptable with the exception of the cholesterol and sugar being elevated, the patient is advised to work on aggressive lifestyle modification including different food choices as well as lowering the fat content of the diet in addition to daily exercises with a minimum of 150 minutes/week in 10-minute increments of mild to moderate exercise like brisk walking    His cholesterol risk is extremely elevated and I strongly recommend that he does take the statin    The 10-year ASCVD risk score (Arun OLIVEIRA, et al., 2019) is: 29.1%    Values used to calculate the score:      Age: 79 years      Sex: Male      Is Non- : No      Diabetic: Yes      Tobacco smoker: No      Systolic Blood Pressure: 305 mmHg      Is BP treated: No      HDL Cholesterol: 48 mg/dL      Total Cholesterol: 218 mg/dL

## 2023-10-25 ENCOUNTER — TELEPHONE (OUTPATIENT)
Dept: INTERNAL MEDICINE CLINIC | Age: 68
End: 2023-10-25

## 2023-10-25 NOTE — TELEPHONE ENCOUNTER
Pt calling went to get his hearing aids replaced and they won't do anything until he gets his ears cleaned out--he does not have a ENT---how do you want to destiney him for this? Please let me know or call pt. Thanks.

## 2023-10-27 ENCOUNTER — OFFICE VISIT (OUTPATIENT)
Dept: INTERNAL MEDICINE CLINIC | Age: 68
End: 2023-10-27
Payer: MEDICARE

## 2023-10-27 VITALS
HEART RATE: 82 BPM | DIASTOLIC BLOOD PRESSURE: 84 MMHG | WEIGHT: 222 LBS | SYSTOLIC BLOOD PRESSURE: 126 MMHG | BODY MASS INDEX: 35.83 KG/M2

## 2023-10-27 DIAGNOSIS — H61.23 BILATERAL IMPACTED CERUMEN: ICD-10-CM

## 2023-10-27 PROCEDURE — G8484 FLU IMMUNIZE NO ADMIN: HCPCS | Performed by: NURSE PRACTITIONER

## 2023-10-27 PROCEDURE — 99212 OFFICE O/P EST SF 10 MIN: CPT | Performed by: NURSE PRACTITIONER

## 2023-10-27 PROCEDURE — 1036F TOBACCO NON-USER: CPT | Performed by: NURSE PRACTITIONER

## 2023-10-27 PROCEDURE — G8417 CALC BMI ABV UP PARAM F/U: HCPCS | Performed by: NURSE PRACTITIONER

## 2023-10-27 PROCEDURE — 3017F COLORECTAL CA SCREEN DOC REV: CPT | Performed by: NURSE PRACTITIONER

## 2023-10-27 PROCEDURE — 69210 REMOVE IMPACTED EAR WAX UNI: CPT | Performed by: NURSE PRACTITIONER

## 2023-10-27 PROCEDURE — G8427 DOCREV CUR MEDS BY ELIG CLIN: HCPCS | Performed by: NURSE PRACTITIONER

## 2023-10-27 PROCEDURE — 1123F ACP DISCUSS/DSCN MKR DOCD: CPT | Performed by: NURSE PRACTITIONER

## 2023-10-27 NOTE — ASSESSMENT & PLAN NOTE
Acute, resolved. Bilateral cerumen impaction removed and tolerated well. follow up with audiology as scheduled.

## 2023-10-27 NOTE — PROGRESS NOTES
status is at baseline. Psychiatric:         Mood and Affect: Mood normal.         Behavior: Behavior normal.       Assessment/Plan:  1. Bilateral impacted cerumen  Assessment & Plan:  Acute, resolved. Bilateral cerumen impaction removed and tolerated well. follow up with audiology as scheduled. Orders:  -     [id number] - [place] REMOVE IMPACTED EAR WAX       Discussed medications with patient, who voiced understanding of their use and indications. All questions answered. Return if symptoms worsen or fail to improve.       Electronically signed by BIGG Watkins CNP on 10/27/2023 at 2:15 PM

## 2023-11-17 NOTE — PROGRESS NOTES
Anti-icteric  HENT:   Head: Normocephalic and atraumatic. Face:  Symmetric, facial nerve intact  Foreign body of left external auditory canal  Mouth/Oral Cavity:  normal lips, Uvula is midline, no mucosal lesions, no trismus, normal dentition, normal salivary quality/flow  Oropharynx/Larynx:  normal oropharynx, normal tonsils  Nose:Normal external nasal appearance. Anterior rhinoscopy shows deviated septum preventing view posteriorly. Normal appearing turbinates. Normal mucosa   NECK: Normal range of motion, no thyromegaly, trachea is midline, no lymphadenopathy, no neck masses, no crepitus  CHEST: Normal respiratory effort, no retractions, breathing comfortably  SKIN: No rashes, normal appearing skin, no evidence of skin lesions/tumors  Neuro:  cranial nerve II-XII intact; normal gait  Cardio:  no edema        PROCEDURE    Removal of left external auditory canal foreign body. The otomicroscope was brought into the field. 2 hearing aid domes were removed with alligator forceps from the right external auditory canal.  There is no significant excoriation or involvement of the tympanic membrane. There is no evidence of infection. Some nonobstructing cerumen was removed. This note was generated completely or in part utilizing Dragon dictation speech recognition software. Occasionally, words are mistranscribed and despite editing, the text may contain inaccuracies due to incorrect word recognition. If further clarification is needed please contact the office at [phone]. An electronic signature was used to authenticate this note.     --Magdiel Corrales MD

## 2023-11-20 ENCOUNTER — OFFICE VISIT (OUTPATIENT)
Dept: ENT CLINIC | Age: 68
End: 2023-11-20
Payer: MEDICARE

## 2023-11-20 VITALS
DIASTOLIC BLOOD PRESSURE: 72 MMHG | HEIGHT: 66 IN | BODY MASS INDEX: 35.2 KG/M2 | SYSTOLIC BLOOD PRESSURE: 144 MMHG | WEIGHT: 219 LBS | HEART RATE: 79 BPM | OXYGEN SATURATION: 98 % | TEMPERATURE: 98 F

## 2023-11-20 DIAGNOSIS — H91.93 BILATERAL HEARING LOSS, UNSPECIFIED HEARING LOSS TYPE: Primary | ICD-10-CM

## 2023-11-20 DIAGNOSIS — H93.8X3 SENSATION OF FULLNESS IN BOTH EARS: ICD-10-CM

## 2023-11-20 DIAGNOSIS — T16.2XXA ACUTE FOREIGN BODY OF LEFT EAR CANAL, INITIAL ENCOUNTER: ICD-10-CM

## 2023-11-20 PROCEDURE — 1036F TOBACCO NON-USER: CPT | Performed by: STUDENT IN AN ORGANIZED HEALTH CARE EDUCATION/TRAINING PROGRAM

## 2023-11-20 PROCEDURE — 99203 OFFICE O/P NEW LOW 30 MIN: CPT | Performed by: STUDENT IN AN ORGANIZED HEALTH CARE EDUCATION/TRAINING PROGRAM

## 2023-11-20 PROCEDURE — 69200 CLEAR OUTER EAR CANAL: CPT | Performed by: STUDENT IN AN ORGANIZED HEALTH CARE EDUCATION/TRAINING PROGRAM

## 2023-11-20 PROCEDURE — G8417 CALC BMI ABV UP PARAM F/U: HCPCS | Performed by: STUDENT IN AN ORGANIZED HEALTH CARE EDUCATION/TRAINING PROGRAM

## 2023-11-20 PROCEDURE — 1123F ACP DISCUSS/DSCN MKR DOCD: CPT | Performed by: STUDENT IN AN ORGANIZED HEALTH CARE EDUCATION/TRAINING PROGRAM

## 2023-11-20 PROCEDURE — 3017F COLORECTAL CA SCREEN DOC REV: CPT | Performed by: STUDENT IN AN ORGANIZED HEALTH CARE EDUCATION/TRAINING PROGRAM

## 2023-11-20 PROCEDURE — G8484 FLU IMMUNIZE NO ADMIN: HCPCS | Performed by: STUDENT IN AN ORGANIZED HEALTH CARE EDUCATION/TRAINING PROGRAM

## 2023-11-20 PROCEDURE — G8427 DOCREV CUR MEDS BY ELIG CLIN: HCPCS | Performed by: STUDENT IN AN ORGANIZED HEALTH CARE EDUCATION/TRAINING PROGRAM

## 2024-01-23 RX ORDER — PANTOPRAZOLE SODIUM 40 MG/1
40 TABLET, DELAYED RELEASE ORAL EVERY MORNING
Qty: 30 TABLET | Refills: 5 | Status: SHIPPED | OUTPATIENT
Start: 2024-01-23

## 2024-01-23 NOTE — TELEPHONE ENCOUNTER
Last OV: 10/27/2023  Next OV: 3/12/2024    Next appointment due:around 3/11/2024     Last fill:7/26/23  Refills:5

## 2024-03-08 DIAGNOSIS — R39.12 BENIGN PROSTATIC HYPERPLASIA WITH WEAK URINARY STREAM: ICD-10-CM

## 2024-03-08 DIAGNOSIS — N40.1 BENIGN PROSTATIC HYPERPLASIA WITH WEAK URINARY STREAM: ICD-10-CM

## 2024-03-08 RX ORDER — FINASTERIDE 5 MG/1
5 TABLET, FILM COATED ORAL DAILY
Qty: 90 TABLET | Refills: 0 | Status: SHIPPED | OUTPATIENT
Start: 2024-03-08

## 2024-03-08 NOTE — TELEPHONE ENCOUNTER
Last OV: 10/27/2023  Next OV: 3/12/2024    Next appointment due:around 3/11/2024     Last fill:9/11/23  Refills:1#90

## 2024-03-12 ENCOUNTER — OFFICE VISIT (OUTPATIENT)
Dept: INTERNAL MEDICINE CLINIC | Age: 69
End: 2024-03-12
Payer: MEDICARE

## 2024-03-12 VITALS
DIASTOLIC BLOOD PRESSURE: 80 MMHG | WEIGHT: 203.6 LBS | HEIGHT: 66 IN | HEART RATE: 83 BPM | BODY MASS INDEX: 32.72 KG/M2 | SYSTOLIC BLOOD PRESSURE: 128 MMHG | OXYGEN SATURATION: 96 %

## 2024-03-12 DIAGNOSIS — Z12.11 COLON CANCER SCREENING: ICD-10-CM

## 2024-03-12 DIAGNOSIS — R39.12 BENIGN PROSTATIC HYPERPLASIA WITH WEAK URINARY STREAM: ICD-10-CM

## 2024-03-12 DIAGNOSIS — E78.00 HYPERCHOLESTEREMIA: ICD-10-CM

## 2024-03-12 DIAGNOSIS — R03.0 ELEVATED BLOOD PRESSURE READING IN OFFICE WITHOUT DIAGNOSIS OF HYPERTENSION: ICD-10-CM

## 2024-03-12 DIAGNOSIS — N40.1 BENIGN PROSTATIC HYPERPLASIA WITH WEAK URINARY STREAM: ICD-10-CM

## 2024-03-12 DIAGNOSIS — E11.9 TYPE 2 DIABETES MELLITUS WITHOUT COMPLICATION, WITHOUT LONG-TERM CURRENT USE OF INSULIN (HCC): Primary | ICD-10-CM

## 2024-03-12 DIAGNOSIS — E11.9 TYPE 2 DIABETES MELLITUS WITHOUT COMPLICATION, WITHOUT LONG-TERM CURRENT USE OF INSULIN (HCC): ICD-10-CM

## 2024-03-12 LAB
ANION GAP SERPL CALCULATED.3IONS-SCNC: 12 MMOL/L (ref 3–16)
BASOPHILS # BLD: 0 K/UL (ref 0–0.2)
BASOPHILS NFR BLD: 1 %
BUN SERPL-MCNC: 14 MG/DL (ref 7–20)
CALCIUM SERPL-MCNC: 9.6 MG/DL (ref 8.3–10.6)
CHLORIDE SERPL-SCNC: 101 MMOL/L (ref 99–110)
CHOLEST SERPL-MCNC: 198 MG/DL (ref 0–199)
CO2 SERPL-SCNC: 26 MMOL/L (ref 21–32)
CREAT SERPL-MCNC: 0.8 MG/DL (ref 0.8–1.3)
CREAT UR-MCNC: 123.4 MG/DL (ref 39–259)
DEPRECATED RDW RBC AUTO: 14 % (ref 12.4–15.4)
EOSINOPHIL # BLD: 0.2 K/UL (ref 0–0.6)
EOSINOPHIL NFR BLD: 5 %
GFR SERPLBLD CREATININE-BSD FMLA CKD-EPI: >60 ML/MIN/{1.73_M2}
GLUCOSE SERPL-MCNC: 98 MG/DL (ref 70–99)
HCT VFR BLD AUTO: 44.1 % (ref 40.5–52.5)
HDLC SERPL-MCNC: 48 MG/DL (ref 40–60)
HGB BLD-MCNC: 15.1 G/DL (ref 13.5–17.5)
LDLC SERPL CALC-MCNC: 129 MG/DL
LYMPHOCYTES # BLD: 0.7 K/UL (ref 1–5.1)
LYMPHOCYTES NFR BLD: 19.8 %
MCH RBC QN AUTO: 29.5 PG (ref 26–34)
MCHC RBC AUTO-ENTMCNC: 34.2 G/DL (ref 31–36)
MCV RBC AUTO: 86.4 FL (ref 80–100)
MICROALBUMIN UR DL<=1MG/L-MCNC: <1.2 MG/DL
MICROALBUMIN/CREAT UR: NORMAL MG/G (ref 0–30)
MONOCYTES # BLD: 0.6 K/UL (ref 0–1.3)
MONOCYTES NFR BLD: 16 %
NEUTROPHILS # BLD: 2.2 K/UL (ref 1.7–7.7)
NEUTROPHILS NFR BLD: 58.2 %
PLATELET # BLD AUTO: 232 K/UL (ref 135–450)
PMV BLD AUTO: 8.5 FL (ref 5–10.5)
POTASSIUM SERPL-SCNC: 4 MMOL/L (ref 3.5–5.1)
RBC # BLD AUTO: 5.11 M/UL (ref 4.2–5.9)
SODIUM SERPL-SCNC: 139 MMOL/L (ref 136–145)
TRIGL SERPL-MCNC: 107 MG/DL (ref 0–150)
VLDLC SERPL CALC-MCNC: 21 MG/DL
WBC # BLD AUTO: 3.7 K/UL (ref 4–11)

## 2024-03-12 PROCEDURE — 2022F DILAT RTA XM EVC RTNOPTHY: CPT | Performed by: INTERNAL MEDICINE

## 2024-03-12 PROCEDURE — 1036F TOBACCO NON-USER: CPT | Performed by: INTERNAL MEDICINE

## 2024-03-12 PROCEDURE — 3017F COLORECTAL CA SCREEN DOC REV: CPT | Performed by: INTERNAL MEDICINE

## 2024-03-12 PROCEDURE — 3046F HEMOGLOBIN A1C LEVEL >9.0%: CPT | Performed by: INTERNAL MEDICINE

## 2024-03-12 PROCEDURE — G8427 DOCREV CUR MEDS BY ELIG CLIN: HCPCS | Performed by: INTERNAL MEDICINE

## 2024-03-12 PROCEDURE — G8484 FLU IMMUNIZE NO ADMIN: HCPCS | Performed by: INTERNAL MEDICINE

## 2024-03-12 PROCEDURE — 99214 OFFICE O/P EST MOD 30 MIN: CPT | Performed by: INTERNAL MEDICINE

## 2024-03-12 PROCEDURE — 1123F ACP DISCUSS/DSCN MKR DOCD: CPT | Performed by: INTERNAL MEDICINE

## 2024-03-12 PROCEDURE — G8417 CALC BMI ABV UP PARAM F/U: HCPCS | Performed by: INTERNAL MEDICINE

## 2024-03-12 RX ORDER — ATORVASTATIN CALCIUM 20 MG/1
TABLET, FILM COATED ORAL
Qty: 30 TABLET | Refills: 5 | Status: SHIPPED | OUTPATIENT
Start: 2024-03-12

## 2024-03-12 ASSESSMENT — ENCOUNTER SYMPTOMS
VISUAL CHANGE: 0
WHEEZING: 0
SHORTNESS OF BREATH: 0
SINUS PAIN: 0
ABDOMINAL PAIN: 0
BLOOD IN STOOL: 0
COLOR CHANGE: 0
CHEST TIGHTNESS: 0
COUGH: 0
CONSTIPATION: 0

## 2024-03-12 ASSESSMENT — PATIENT HEALTH QUESTIONNAIRE - PHQ9
2. FEELING DOWN, DEPRESSED OR HOPELESS: 0
SUM OF ALL RESPONSES TO PHQ QUESTIONS 1-9: 0
1. LITTLE INTEREST OR PLEASURE IN DOING THINGS: 0
SUM OF ALL RESPONSES TO PHQ QUESTIONS 1-9: 0
SUM OF ALL RESPONSES TO PHQ QUESTIONS 1-9: 0
SUM OF ALL RESPONSES TO PHQ9 QUESTIONS 1 & 2: 0
SUM OF ALL RESPONSES TO PHQ QUESTIONS 1-9: 0

## 2024-03-12 NOTE — PROGRESS NOTES
weight change.   HENT:  Negative for congestion, ear pain and sinus pain.    Respiratory:  Negative for cough, chest tightness, shortness of breath and wheezing.    Cardiovascular:  Negative for chest pain, palpitations and PND.   Gastrointestinal:  Negative for abdominal pain, blood in stool and constipation.   Endocrine: Negative for cold intolerance, heat intolerance and polyuria.   Genitourinary:  Negative for dysuria, frequency and urgency.   Musculoskeletal:  Negative for arthralgias and myalgias.   Skin:  Negative for color change and rash.   Neurological:  Negative for weakness and headaches.   Hematological:  Negative for adenopathy. Does not bruise/bleed easily.   Psychiatric/Behavioral:  Negative for agitation, dysphoric mood and sleep disturbance.           Objective   Physical Exam  Vitals and nursing note reviewed.   Constitutional:       General: He is not in acute distress.     Appearance: Normal appearance.   HENT:      Head: Normocephalic and atraumatic.      Right Ear: Tympanic membrane normal.      Left Ear: Tympanic membrane normal.      Nose: Nose normal.   Eyes:      Extraocular Movements: Extraocular movements intact.      Conjunctiva/sclera: Conjunctivae normal.      Pupils: Pupils are equal, round, and reactive to light.   Neck:      Vascular: No carotid bruit.   Cardiovascular:      Rate and Rhythm: Normal rate and regular rhythm.      Pulses: Normal pulses.      Heart sounds: No murmur heard.  Pulmonary:      Effort: Pulmonary effort is normal. No respiratory distress.      Breath sounds: Normal breath sounds.   Abdominal:      General: Abdomen is flat. Bowel sounds are normal. There is no distension.      Palpations: Abdomen is soft.      Tenderness: There is no abdominal tenderness.   Musculoskeletal:         General: No swelling, tenderness or deformity.      Cervical back: Normal range of motion and neck supple. No rigidity or tenderness.      Right lower leg: No edema.      Left

## 2024-03-13 LAB
EST. AVERAGE GLUCOSE BLD GHB EST-MCNC: 125.5 MG/DL
HBA1C MFR BLD: 6 %

## 2024-03-13 NOTE — RESULT ENCOUNTER NOTE
Please advise patient that the results were acceptable with the exception of the cholesterol being elevated, the patient is advised to work on aggressive lifestyle modification including different food choices as well as lowering the fat content of the diet in addition to daily exercises with a minimum of 150 minutes/week in 10-minute increments of mild to moderate exercise like brisk walking

## 2024-04-03 DIAGNOSIS — N40.1 BENIGN PROSTATIC HYPERPLASIA WITH WEAK URINARY STREAM: ICD-10-CM

## 2024-04-03 DIAGNOSIS — R39.12 BENIGN PROSTATIC HYPERPLASIA WITH WEAK URINARY STREAM: ICD-10-CM

## 2024-04-03 RX ORDER — TAMSULOSIN HYDROCHLORIDE 0.4 MG/1
0.4 CAPSULE ORAL DAILY
Qty: 30 CAPSULE | Refills: 5 | Status: SHIPPED | OUTPATIENT
Start: 2024-04-03

## 2024-04-03 NOTE — PROGRESS NOTES
Last OV: 3/12/2024  Next OV: 9/10/2024    Next appointment due:    Last fill:09/11/23  Refills:5  #30

## 2024-04-19 NOTE — PROGRESS NOTES
Patient Reached: ___ Yes  _X__ No    Left Voice Message with Instructions: ___ Yes  _X__ No    ** UNABLE TO REACH PATIENT AT ALL NUMBERS LISTED      Date: 5/3/2024      Arrival Time: 10:00 AM   Procedure Time:       Location:  [address] [facility] [address]. [place], [place]      -If you have not received your bowel prep and instructions from the office. Please reach out and have that arranged.    -Be sure to follow the instructions from the office on taking the bowel prep.    -You need to follow a CLEAR LIQUID diet the day prior to the procedure.    -No liquids after midnight. (Unless you are instructed to have water with the second portion of bowel prep)    -Follow any instructions your doctors office has given you including what medications to take the AM of your procedure and which ones to hold. You may use your inhalers - bring rescue inhalers with you DOS. If you take a long acting insulin the katt prior please cut the dose in half and take no diabetic medications that AM. Follow specific doctors office instructions regarding blood thinners and if they want you to hold and for how long. If you are on a blood thinner and have no instructions please contact the office and ask.      -Dress comfortably,bring your insurance card,picture ID,and a complete list of medications, including supplements.    -You must have a responsible adult to stay with you during the procedure,drive you home and stay with you.      -Please call [employer] at [phone] with any questions or concerns.      OTHER INSTRUCTIONS:          VISITOR POLICY (subject to change)    Current visitor policy is 2 visitors per patient. No children allowed unde the age of 14. Mask at discretion of facility. Visiting hours are 8a-8p. Overnight visitors will be at the discretion of the nurse. All policies are subject to change.

## 2024-04-22 RX ORDER — MAGNESIUM 200 MG
200 TABLET ORAL EVERY EVENING
COMMUNITY

## 2024-04-22 NOTE — PROGRESS NOTES
Patient _X__ reached   _____not reached-preop instructions left on voice mail_____________      DATE__5/3/24______ TIME___1130_____ARRIVAL___1000 FEC______      Nothing to eat or drink after midnight the night before,except for what the prep instructions call for.If you do not have the instructions or do not understand them please contact your doctors office.    Follow any instructions your doctors office has given you including what medications to take the AM of your procedure and which ones to hold.You may use your inhalers - bring rescue inhalers with you DOS.If you take a long acting insulin the katt prior please cut the dose in half and take no diabetic medications that AM.Follow specific doctors office instructions regarding blood thinners and if they want you to hold and for how long. If you are on a blood thinner and have no instructions please contact the office and ask.    Dress comfortably,bring your insurance card,picture ID,and a complete list of medications, including supplements.    You must have a responsible adult to stay with you during the procedure,drive you home and stay with you.    [facility] phone number [phone] for any questions.      OTHER INSTRUCTIONS(if applicable)_______none__________________________________________________        VISITOR POLICY(subject to change)    Current visitor policy is 2 visitors per patient.No children allowed. Mask at discretion of facility.  Visiting hours are 8a-8p. Overnight visitors will be at the discretion of the nurse. All policies are subject to change.

## 2024-05-03 ENCOUNTER — ANESTHESIA (OUTPATIENT)
Dept: ENDOSCOPY | Age: 69
End: 2024-05-03
Payer: MEDICARE

## 2024-05-03 ENCOUNTER — ANESTHESIA EVENT (OUTPATIENT)
Dept: ENDOSCOPY | Age: 69
End: 2024-05-03
Payer: MEDICARE

## 2024-05-03 ENCOUNTER — HOSPITAL ENCOUNTER (OUTPATIENT)
Age: 69
Setting detail: OUTPATIENT SURGERY
Discharge: HOME | End: 2024-05-03
Attending: INTERNAL MEDICINE | Admitting: INTERNAL MEDICINE
Payer: MEDICARE

## 2024-05-03 VITALS
DIASTOLIC BLOOD PRESSURE: 75 MMHG | HEART RATE: 70 BPM | OXYGEN SATURATION: 99 % | RESPIRATION RATE: 16 BRPM | WEIGHT: 191 LBS | HEIGHT: 66 IN | BODY MASS INDEX: 30.7 KG/M2 | SYSTOLIC BLOOD PRESSURE: 129 MMHG | TEMPERATURE: 97.8 F

## 2024-05-03 DIAGNOSIS — Z12.11 COLON CANCER SCREENING: ICD-10-CM

## 2024-05-03 LAB
GLUCOSE BLD-MCNC: 71 MG/DL (ref 70–99)
GLUCOSE BLD-MCNC: 82 MG/DL (ref 70–99)
PERFORMED ON: NORMAL
PERFORMED ON: NORMAL

## 2024-05-03 PROCEDURE — 7100000010 HC PHASE II RECOVERY - FIRST 15 MIN: Performed by: INTERNAL MEDICINE

## 2024-05-03 PROCEDURE — 7100000011 HC PHASE II RECOVERY - ADDTL 15 MIN: Performed by: INTERNAL MEDICINE

## 2024-05-03 PROCEDURE — 2580000003 HC RX 258: Performed by: ANESTHESIOLOGY

## 2024-05-03 PROCEDURE — 88305 TISSUE EXAM BY PATHOLOGIST: CPT

## 2024-05-03 PROCEDURE — 2709999900 HC NON-CHARGEABLE SUPPLY: Performed by: INTERNAL MEDICINE

## 2024-05-03 PROCEDURE — 3700000000 HC ANESTHESIA ATTENDED CARE: Performed by: INTERNAL MEDICINE

## 2024-05-03 PROCEDURE — 3609010600 HC COLONOSCOPY POLYPECTOMY SNARE/COLD BIOPSY: Performed by: INTERNAL MEDICINE

## 2024-05-03 PROCEDURE — 6360000002 HC RX W HCPCS: Performed by: NURSE ANESTHETIST, CERTIFIED REGISTERED

## 2024-05-03 PROCEDURE — 2500000003 HC RX 250 WO HCPCS: Performed by: NURSE ANESTHETIST, CERTIFIED REGISTERED

## 2024-05-03 PROCEDURE — 3700000001 HC ADD 15 MINUTES (ANESTHESIA): Performed by: INTERNAL MEDICINE

## 2024-05-03 RX ORDER — DEXMEDETOMIDINE HYDROCHLORIDE 100 UG/ML
INJECTION, SOLUTION INTRAVENOUS PRN
Status: DISCONTINUED | OUTPATIENT
Start: 2024-05-03 | End: 2024-05-03 | Stop reason: SDUPTHER

## 2024-05-03 RX ORDER — PROPOFOL 10 MG/ML
INJECTION, EMULSION INTRAVENOUS PRN
Status: DISCONTINUED | OUTPATIENT
Start: 2024-05-03 | End: 2024-05-03 | Stop reason: SDUPTHER

## 2024-05-03 RX ORDER — SODIUM CHLORIDE 9 MG/ML
INJECTION, SOLUTION INTRAVENOUS CONTINUOUS
Status: DISCONTINUED | OUTPATIENT
Start: 2024-05-03 | End: 2024-05-03 | Stop reason: HOSPADM

## 2024-05-03 RX ORDER — LIDOCAINE HYDROCHLORIDE 20 MG/ML
INJECTION, SOLUTION INFILTRATION; PERINEURAL PRN
Status: DISCONTINUED | OUTPATIENT
Start: 2024-05-03 | End: 2024-05-03 | Stop reason: SDUPTHER

## 2024-05-03 RX ADMIN — SODIUM CHLORIDE: 9 INJECTION, SOLUTION INTRAVENOUS at 11:09

## 2024-05-03 RX ADMIN — PROPOFOL 50 MG: 10 INJECTION, EMULSION INTRAVENOUS at 13:09

## 2024-05-03 RX ADMIN — LIDOCAINE HYDROCHLORIDE 100 MG: 20 INJECTION, SOLUTION INFILTRATION; PERINEURAL at 13:08

## 2024-05-03 RX ADMIN — PROPOFOL 50 MG: 10 INJECTION, EMULSION INTRAVENOUS at 13:08

## 2024-05-03 RX ADMIN — PROPOFOL 50 MG: 10 INJECTION, EMULSION INTRAVENOUS at 13:11

## 2024-05-03 RX ADMIN — PROPOFOL 150 MCG/KG/MIN: 10 INJECTION, EMULSION INTRAVENOUS at 13:12

## 2024-05-03 RX ADMIN — PROPOFOL 50 MG: 10 INJECTION, EMULSION INTRAVENOUS at 13:10

## 2024-05-03 RX ADMIN — DEXMEDETOMIDINE HYDROCHLORIDE 10 MCG: 100 INJECTION, SOLUTION INTRAVENOUS at 13:08

## 2024-05-03 ASSESSMENT — PAIN - FUNCTIONAL ASSESSMENT
PAIN_FUNCTIONAL_ASSESSMENT: 0-10

## 2024-05-03 NOTE — H&P
Gastroenterology Note             Pre-operative History and Physical    Patient: Fabio ePrry  : 1955  CSN:     History Obtained From:  patient and/or guardian.     HISTORY OF PRESENT ILLNESS:    The patient is a 69 y.o. male with family history of colon cancer (Father 70) here for high risk screening colonoscopy.      Past Medical History:    Past Medical History:   Diagnosis Date    Benign prostatic hyperplasia with weak urinary stream     Diverticulosis     Fistula     x2 rectal area    Gastroesophageal reflux disease without esophagitis 2023    GERD (gastroesophageal reflux disease)     Hiatal hernia     Nephrolithiasis     Prolonged emergence from general anesthesia     Type 2 diabetes mellitus without complication (HCC)     Unspecified sleep apnea     uses cpap     Past Surgical History:    Past Surgical History:   Procedure Laterality Date    CYST REMOVAL      perineum    EYE SURGERY  [id number]    OTHER SURGICAL HISTORY      marybel rectal abscess repair     Medications Prior to Admission:   No current facility-administered medications on file prior to encounter.     Current Outpatient Medications on File Prior to Encounter   Medication Sig Dispense Refill    magnesium 200 MG TABS tablet Take 1 tablet by mouth every evening      tamsulosin (FLOMAX) 0.4 MG capsule Take 1 capsule by mouth daily (Patient taking differently: Take 1 capsule by mouth every evening) 30 capsule 5    finasteride (PROSCAR) 5 MG tablet TAKE 1 TABLET BY MOUTH DAILY (Patient taking differently: Take 1 tablet by mouth every evening) 90 tablet 0    pantoprazole (PROTONIX) 40 MG tablet TAKE ONE TABLET BY MOUTH EVERY MORNING (Patient taking differently: Take 1 tablet by mouth every evening) 30 tablet 5    blood glucose test strips (CONTOUR TEST) strip 1 each by In Vitro route 2 times daily As needed. 100 each 3    Microlet Lancets MISC 1 each by Does not apply route 2 times daily 100 each 3    triamcinolone (KENALOG)

## 2024-05-03 NOTE — BRIEF OP NOTE
Brief Postoperative Note - Full Note in Chart Review/Procedures tab       Patient: Fabio Perry  YOB: 1955  MRN: [id number]    Date of Procedure: 5/3/2024    Pre-Op Diagnosis Codes:     * Colon cancer screening [Z12.11]    Post-Op Diagnosis: Same       Procedure(s):  COLONOSCOPY POLYPECTOMY SNARE BIOPSY    Surgeon(s):  Gamaliel Fuentes MD    Assistant:  * No surgical staff found *    Anesthesia: Monitor Anesthesia Care    Estimated Blood Loss (mL): Minimal    Complications: None    Specimens:   ID Type Source Tests Collected by Time Destination   A : transverse colon polyp x2 Tissue Colon SURGICAL PATHOLOGY Gamaliel Fuentes MD 5/3/2024 1322        Implants:  * No implants in log *      Drains: * No LDAs found *    Findings:  Infection Present At Time Of Surgery (PATOS) (choose all levels that have infection present):  No infection present  Other Findings:   Two 4 mm polyps of transverse colon - D12.3 removed with cold snare  Moderate sigmoid colon diverticulosis - K57.30    Rec:  Resume diet  Continue present treatment.  Await pathology results.   F/U WITH ME AS NEEDED  Followup colonoscopy in 5 years if polyp(s) are adenomatous, ten years if not.  Followup with referring physician as previously instucted    Electronically signed by Gamaliel Fuentes MD on 5/3/2024 at 1:30 PM

## 2024-05-03 NOTE — ANESTHESIA PRE PROCEDURE
Department of Anesthesiology  Preprocedure Note       Name:  Fabio Perry   Age:  69 y.o.  :  1955                                          MRN:  [id number]         Date:  5/3/2024      Surgeon: Surgeon(s):  Gamaliel Fuentes MD    Procedure: Procedure(s):  COLONOSCOPY DIAGNOSTIC    Medications prior to admission:   Prior to Admission medications    Medication Sig Start Date End Date Taking? Authorizing Provider   magnesium 200 MG TABS tablet Take 1 tablet by mouth every evening   Yes Radha Dow MD   tamsulosin (FLOMAX) 0.4 MG capsule Take 1 capsule by mouth daily  Patient taking differently: Take 1 capsule by mouth every evening 4/3/24   Maritza Birmingham MD   finasteride (PROSCAR) 5 MG tablet TAKE 1 TABLET BY MOUTH DAILY  Patient taking differently: Take 1 tablet by mouth every evening 3/8/24   Maritza Birmingham MD   pantoprazole (PROTONIX) 40 MG tablet TAKE ONE TABLET BY MOUTH EVERY MORNING  Patient taking differently: Take 1 tablet by mouth every evening 24   Maritza Birmingham MD   blood glucose test strips (CONTOUR TEST) strip 1 each by In Vitro route 2 times daily As needed. 11/3/20   Elvin De Guzman MD   Microlet Lancets MISC 1 each by Does not apply route 2 times daily 11/3/20   Elvin De Guzman MD   triamcinolone (KENALOG) 0.025 % cream Apply topically every 4 hours as needed Apply Topically    Radha Dow MD   clobetasol (TEMOVATE) 0.05 % cream Apply topically as needed    Radha Dow MD   selenium sulfide (SELSUN) 2.5 % lotion  14   Radha Dow MD       Current medications:    Current Facility-Administered Medications   Medication Dose Route Frequency Provider Last Rate Last Admin   • 0.9 % sodium chloride infusion   IntraVENous Continuous Shagufta Phoneix  mL/hr at 24 1109 New Bag at 24 1109       Allergies:    Allergies   Allergen Reactions   • Fentanyl      Oversedation   • Midazolam Other (See Comments)      Admission

## 2024-05-03 NOTE — ANESTHESIA POSTPROCEDURE EVALUATION
Department of Anesthesiology  Postprocedure Note    Patient: Fabio Perry  MRN: [id number]  YOB: 1955  Date of evaluation: 5/3/2024    Procedure Summary       Date: 05/03/24 Room / Location: [facility] ENDO  / [facility] [place] [facility]    Anesthesia Start: 1304 Anesthesia Stop: 1334    Procedure: COLONOSCOPY POLYPECTOMY SNARE BIOPSY Diagnosis:       Colon cancer screening      (Colon cancer screening [Z12.11])    Surgeons: Gamaliel Fuentes MD Responsible Provider: Jonathan Moya MD    Anesthesia Type: MAC ASA Status: 3            Anesthesia Type: No value filed.    Rosa Maria Phase I: Rosa Maria Score: 10    Rosa Maria Phase II: Rosa Maria Score: 10    Anesthesia Post Evaluation    Patient location during evaluation: PACU  Patient participation: complete - patient participated  Level of consciousness: awake and alert  Airway patency: patent  Nausea & Vomiting: no nausea and no vomiting  Cardiovascular status: blood pressure returned to baseline  Respiratory status: acceptable  Hydration status: euvolemic  Multimodal analgesia pain management approach  Pain management: adequate    No notable events documented.

## 2024-05-03 NOTE — DISCHARGE INSTRUCTIONS
physician has instructed you otherwise. Avoid spicy and greasy food on first meal.  Drink plenty of water.  If nausea becomes a problem call your physician.    Call your doctor if concerns arise.      Learning About Diverticulosis and Diverticulitis  What are diverticulosis and diverticulitis?     In diverticulosis and diverticulitis, pouches called diverticula form in the wall of the large intestine, or colon.  In diverticulosis, the pouches do not cause any pain or other symptoms.  In diverticulitis, the pouches get inflamed or infected and cause symptoms.  Doctors aren't sure what causes these pouches in the colon. But they think that a low-fiber diet may play a role. A low-fiber diet can cause small, hard stools. This means it takes more pressure in the colon to move stools out of the body. This puts more pressure on the walls of the colon. The pressure from this may cause pouches to form in weak spots along the colon.  What are the symptoms?  In diverticulosis, most people don't have symptoms.  In diverticulitis, symptoms may last from a few hours to a week or more. They include:  Belly pain. This is usually in the lower left side. It is sometimes worse when you move. This is the most common symptom.  Fever and chills.  Bloating and gas.  Diarrhea or constipation.  Nausea and sometimes vomiting.  Not feeling like eating.  If the pouches bleed, it is called diverticular bleeding.  How can you prevent diverticulitis?  You may be able to lower your chance of getting diverticulitis. You can do this by taking steps to prevent constipation.  Eat fruits, vegetables, beans, and whole grains every day. These foods are high in fiber.  Drink plenty of fluids. If you have kidney, heart, or liver disease and have to limit fluids, talk with your doctor before you increase the amount of fluids you drink.  Get at least 30 minutes of exercise on most days of the week. Walking is a good choice.  Take a fiber supplement (such as  safety. Be sure to make and go to all appointments, and call your doctor if you are having problems. It's also a good idea to know your test results and keep a list of the medicines you take.  How can you care for yourself at home?  Regular exams to look for colon polyps are the best way to prevent polyps from turning into colon cancer. These can include stool tests, sigmoidoscopy, colonoscopy, and CT colonography. Talk with your doctor about a testing schedule that is right for you.  To prevent polyps  There is no home treatment that can prevent colon polyps. But these steps may help lower your risk for cancer.  Stay active. Being active can help you get to and stay at a healthy weight. Try to exercise on most days of the week. Walking is a good choice.  Eat well. Choose a variety of vegetables, fruits, legumes (such as peas and beans), fish, poultry, and whole grains.  Do not smoke. If you need help quitting, talk to your doctor about stop-smoking programs and medicines. These can increase your chances of quitting for good.  If you drink alcohol, limit how much you drink. Limit alcohol to 2 drinks a day for men and 1 drink a day for women.  When should you call for help?   Call your doctor now or seek immediate medical care if:    You have severe belly pain.     Your stools are maroon or very bloody.   Watch closely for changes in your health, and be sure to contact your doctor if:    You have a fever.     You have nausea or vomiting.     You have a change in bowel habits (new constipation or diarrhea).     Your symptoms get worse or are not improving as expected.   Where can you learn more?  Go to https://www.[employer].net/patientEd and enter C571 to learn more about \"Colon Polyps: Care Instructions.\"  Current as of: October 19, 2023               Content Version: 14.0  © 2006-2024 [employer].   Care instructions adapted under license by [employer]. If you have questions about a medical condition

## 2024-06-04 SDOH — ECONOMIC STABILITY: FOOD INSECURITY: WITHIN THE PAST 12 MONTHS, YOU WORRIED THAT YOUR FOOD WOULD RUN OUT BEFORE YOU GOT MONEY TO BUY MORE.: NEVER TRUE

## 2024-06-04 SDOH — ECONOMIC STABILITY: INCOME INSECURITY: HOW HARD IS IT FOR YOU TO PAY FOR THE VERY BASICS LIKE FOOD, HOUSING, MEDICAL CARE, AND HEATING?: NOT HARD AT ALL

## 2024-06-04 SDOH — ECONOMIC STABILITY: TRANSPORTATION INSECURITY
IN THE PAST 12 MONTHS, HAS LACK OF TRANSPORTATION KEPT YOU FROM MEETINGS, WORK, OR FROM GETTING THINGS NEEDED FOR DAILY LIVING?: NO

## 2024-06-04 SDOH — ECONOMIC STABILITY: FOOD INSECURITY: WITHIN THE PAST 12 MONTHS, THE FOOD YOU BOUGHT JUST DIDN'T LAST AND YOU DIDN'T HAVE MONEY TO GET MORE.: NEVER TRUE

## 2024-06-05 ENCOUNTER — OFFICE VISIT (OUTPATIENT)
Dept: INTERNAL MEDICINE CLINIC | Age: 69
End: 2024-06-05
Payer: MEDICARE

## 2024-06-05 VITALS
WEIGHT: 187.4 LBS | HEIGHT: 66 IN | OXYGEN SATURATION: 99 % | HEART RATE: 76 BPM | BODY MASS INDEX: 30.12 KG/M2 | DIASTOLIC BLOOD PRESSURE: 74 MMHG | SYSTOLIC BLOOD PRESSURE: 128 MMHG

## 2024-06-05 DIAGNOSIS — R39.12 BENIGN PROSTATIC HYPERPLASIA WITH WEAK URINARY STREAM: ICD-10-CM

## 2024-06-05 DIAGNOSIS — N40.1 BENIGN PROSTATIC HYPERPLASIA WITH WEAK URINARY STREAM: ICD-10-CM

## 2024-06-05 DIAGNOSIS — E11.9 TYPE 2 DIABETES MELLITUS WITHOUT COMPLICATION, WITHOUT LONG-TERM CURRENT USE OF INSULIN (HCC): ICD-10-CM

## 2024-06-05 DIAGNOSIS — K40.90 LEFT INGUINAL HERNIA: Primary | ICD-10-CM

## 2024-06-05 PROCEDURE — 3044F HG A1C LEVEL LT 7.0%: CPT | Performed by: INTERNAL MEDICINE

## 2024-06-05 PROCEDURE — 99214 OFFICE O/P EST MOD 30 MIN: CPT | Performed by: INTERNAL MEDICINE

## 2024-06-05 PROCEDURE — G8427 DOCREV CUR MEDS BY ELIG CLIN: HCPCS | Performed by: INTERNAL MEDICINE

## 2024-06-05 PROCEDURE — 2022F DILAT RTA XM EVC RTNOPTHY: CPT | Performed by: INTERNAL MEDICINE

## 2024-06-05 PROCEDURE — 3017F COLORECTAL CA SCREEN DOC REV: CPT | Performed by: INTERNAL MEDICINE

## 2024-06-05 PROCEDURE — G8417 CALC BMI ABV UP PARAM F/U: HCPCS | Performed by: INTERNAL MEDICINE

## 2024-06-05 PROCEDURE — 1036F TOBACCO NON-USER: CPT | Performed by: INTERNAL MEDICINE

## 2024-06-05 PROCEDURE — 1123F ACP DISCUSS/DSCN MKR DOCD: CPT | Performed by: INTERNAL MEDICINE

## 2024-06-05 RX ORDER — FINASTERIDE 5 MG/1
5 TABLET, FILM COATED ORAL DAILY
Qty: 90 TABLET | Refills: 1 | Status: SHIPPED | OUTPATIENT
Start: 2024-06-05

## 2024-06-05 ASSESSMENT — ENCOUNTER SYMPTOMS: ABDOMINAL PAIN: 1

## 2024-06-05 NOTE — PROGRESS NOTES
Fabio Perry (:  1955) is a 69 y.o. male,Established patient, here for evaluation of the following chief complaint(s):  Hernia      Assessment & Plan   ASSESSMENT/PLAN:  1. Left inguinal hernia  -     Deion Duval MD, General Surgery, [facility]  2. Type 2 diabetes mellitus without complication, without long-term current use of insulin (HCC)      No follow-ups on file.         Subjective   SUBJECTIVE/OBJECTIVE:    Lab Review   Lab Results   Component Value Date/Time     2024 08:33 AM     2023 09:36 AM     2022 09:05 AM    K 4.0 2024 08:33 AM    K 3.6 2023 09:36 AM    K 4.0 2022 09:05 AM    CO2 26 2024 08:33 AM    CO2 26 2023 09:36 AM    CO2 24 2022 09:05 AM    BUN 14 2024 08:33 AM    BUN 13 2023 09:36 AM    BUN 16 2022 09:05 AM    CREATININE 0.8 2024 08:33 AM    CREATININE 0.9 2023 09:36 AM    CREATININE 0.7 2022 09:05 AM    GLUCOSE 98 2024 08:33 AM    GLUCOSE 125 2023 09:36 AM    GLUCOSE 94 2022 09:05 AM    CALCIUM 9.6 2024 08:33 AM    CALCIUM 9.7 2023 09:36 AM    CALCIUM 9.6 2022 09:05 AM     Lab Results   Component Value Date/Time    WBC 3.7 2024 08:33 AM    WBC 4.7 2023 09:36 AM    WBC 5.6 2019 08:28 AM    HGB 15.1 2024 08:33 AM    HGB 14.1 2023 09:36 AM    HGB 15.0 2019 08:28 AM    HCT 44.1 2024 08:33 AM    HCT 42.8 2023 09:36 AM    HCT 44.6 2019 08:28 AM    MCV 86.4 2024 08:33 AM    MCV 88.8 2023 09:36 AM    MCV 89.3 2019 08:28 AM     2024 08:33 AM     2023 09:36 AM     2019 08:28 AM     Lab Results   Component Value Date/Time    CHOL 198 2024 08:33 AM    CHOL 218 2023 09:36 AM    CHOL 195 2022 09:05 AM    TRIG 107 2024 08:33 AM    TRIG 115 2023 09:36 AM    TRIG 57 2022 09:05 AM    HDL 48 2024

## 2024-06-17 ENCOUNTER — OFFICE VISIT (OUTPATIENT)
Dept: SURGERY | Age: 69
End: 2024-06-17
Payer: MEDICARE

## 2024-06-17 VITALS
HEIGHT: 66 IN | SYSTOLIC BLOOD PRESSURE: 120 MMHG | DIASTOLIC BLOOD PRESSURE: 80 MMHG | WEIGHT: 185 LBS | BODY MASS INDEX: 29.73 KG/M2

## 2024-06-17 DIAGNOSIS — K40.90 LEFT INGUINAL HERNIA: Primary | ICD-10-CM

## 2024-06-17 PROCEDURE — 1123F ACP DISCUSS/DSCN MKR DOCD: CPT | Performed by: SURGERY

## 2024-06-17 PROCEDURE — 1036F TOBACCO NON-USER: CPT | Performed by: SURGERY

## 2024-06-17 PROCEDURE — 99203 OFFICE O/P NEW LOW 30 MIN: CPT | Performed by: SURGERY

## 2024-06-17 PROCEDURE — G8427 DOCREV CUR MEDS BY ELIG CLIN: HCPCS | Performed by: SURGERY

## 2024-06-17 PROCEDURE — G8417 CALC BMI ABV UP PARAM F/U: HCPCS | Performed by: SURGERY

## 2024-06-17 PROCEDURE — 3017F COLORECTAL CA SCREEN DOC REV: CPT | Performed by: SURGERY

## 2024-06-17 RX ORDER — SODIUM CHLORIDE 0.9 % (FLUSH) 0.9 %
5-40 SYRINGE (ML) INJECTION PRN
OUTPATIENT
Start: 2024-06-17

## 2024-06-17 RX ORDER — SODIUM CHLORIDE 0.9 % (FLUSH) 0.9 %
5-40 SYRINGE (ML) INJECTION EVERY 12 HOURS SCHEDULED
OUTPATIENT
Start: 2024-06-17

## 2024-06-17 RX ORDER — SODIUM CHLORIDE 9 MG/ML
INJECTION, SOLUTION INTRAVENOUS PRN
OUTPATIENT
Start: 2024-06-17

## 2024-06-17 ASSESSMENT — ENCOUNTER SYMPTOMS
CHEST TIGHTNESS: 0
EYE DISCHARGE: 0
BACK PAIN: 0
COLOR CHANGE: 0
EYE ITCHING: 0
ABDOMINAL PAIN: 0
APNEA: 1
ABDOMINAL DISTENTION: 0

## 2024-06-17 NOTE — PROGRESS NOTES
Vascular: No JVD.   Cardiovascular:      Rate and Rhythm: Normal rate and regular rhythm.      Heart sounds: Normal heart sounds.   Pulmonary:      Effort: Pulmonary effort is normal. No respiratory distress.      Breath sounds: Normal breath sounds. No wheezing.   Abdominal:      General: Bowel sounds are normal. There is no distension.      Palpations: Abdomen is soft. There is no mass.      Tenderness: There is no abdominal tenderness. There is no guarding.       Musculoskeletal:      Cervical back: Normal range of motion and neck supple.   Lymphadenopathy:      Cervical: No cervical adenopathy.   Skin:     General: Skin is warm and dry.      Findings: No erythema or rash.   Neurological:      Mental Status: He is alert and oriented to person, place, and time.   Psychiatric:         Behavior: Behavior normal.         Thought Content: Thought content normal.         Judgment: Judgment normal.          Labs:  No visits with results within 6 Week(s) from this visit.   Latest known visit with results is:   Admission on 05/03/2024, Discharged on 05/03/2024   Component Date Value Ref Range Status    POC Glucose 05/03/2024 82  70 - 99 mg/dl Final    Performed on 05/03/2024 ACCU-CHEK   Final    POC Glucose 05/03/2024 71  70 - 99 mg/dl Final    Performed on 05/03/2024 ACCU-CHEK   Final       Imaging:  No results found.    ASSESSMENT:  Incarcerated left inguinal hernia    PLAN:  1. We discussed the risks of surgery including bleeding, infection, damage to surrounding structures, conversion to open, hernia recurrence, chronic pain as well as anesthesia related complications. Increased risk of recurrence is associated with smoking, diabetes, obesity as well as heavy lifting over 20lbs sooner than 6 weeks after the surgery. We also discussed minimally invasive vs open technique. The benefits of the procedure include repair of the hernia, prevention of future incarceration and return to normal daily activity. Alternatives

## 2024-06-17 NOTE — PATIENT INSTRUCTIONS
1. We discussed the risks of surgery including bleeding, infection, damage to surrounding structures, conversion to open, hernia recurrence, chronic pain as well as anesthesia related complications. Increased risk of recurrence is associated with smoking, diabetes, obesity as well as heavy lifting over 20lbs sooner than 6 weeks after the surgery. We also discussed minimally invasive vs open technique. The benefits of the procedure include repair of the hernia, prevention of future incarceration and return to normal daily activity. Alternatives discussed include close observation. Details of the procedure were discussed and all questions answered. The patient understands, agrees, and wishes to proceed with minimally invasive procedure  2. Warning signs of a strangulated hernia include increased and constant pain, nausea, vomiting, fevers, chills and constipation. This is a surgical emergency and the patient should contact the office or present to an emergency department  3. Will schedule for robot assisted laparoscopic left and possible bilateral inguinal hernia repair with mesh ([id number]) with general anesthesia and as outpatient procedure

## 2024-07-19 RX ORDER — PANTOPRAZOLE SODIUM 40 MG/1
40 TABLET, DELAYED RELEASE ORAL EVERY MORNING
Qty: 90 TABLET | Refills: 0 | Status: SHIPPED | OUTPATIENT
Start: 2024-07-19

## 2024-07-19 NOTE — TELEPHONE ENCOUNTER
Last OV: 6/5/2024  Next OV: 9/10/2024    Next appointment due:Return in about 6 months (around 9/12/2024).    Last fill:1/23/24  Refills:5

## 2024-08-03 DIAGNOSIS — R39.12 BENIGN PROSTATIC HYPERPLASIA WITH WEAK URINARY STREAM: ICD-10-CM

## 2024-08-03 DIAGNOSIS — N40.1 BENIGN PROSTATIC HYPERPLASIA WITH WEAK URINARY STREAM: ICD-10-CM

## 2024-08-05 RX ORDER — TAMSULOSIN HYDROCHLORIDE 0.4 MG/1
0.4 CAPSULE ORAL EVERY EVENING
Qty: 90 CAPSULE | Refills: 0 | Status: SHIPPED | OUTPATIENT
Start: 2024-08-05 | End: 2024-11-03

## 2024-08-05 NOTE — TELEPHONE ENCOUNTER
Last OV: 6/5/2024  Next OV: 9/10/2024    Next appointment due:around 9/12/2024     Last fill:4/3/24  Refills:5

## (undated) DEVICE — BW-412T DISP COMBO CLEANING BRUSH: Brand: SINGLE USE COMBINATION CLEANING BRUSH

## (undated) DEVICE — SNARE COLD DIAMOND 15MM THIN

## (undated) DEVICE — ENDOSCOPIC KIT 6X3/16 FT COLON W/ 1.1 OZ 2 GWN W/O BRSH

## (undated) DEVICE — SOLUTION IV IRRIG WATER 500ML POUR BRL ST 2F7113

## (undated) DEVICE — TRAP SPEC RETRV CLR PLAS POLYP IN LN SUCT QUIK CTCH

## (undated) DEVICE — AIR/WATER CLEANING ADAPTER FOR OLYMPUS® GI ENDOSCOPE: Brand: BULLDOG®

## (undated) DEVICE — SINGLE USE AIR/WATER, SUCTION AND BIOPSY VALVES SET: Brand: ORCAPOD™